# Patient Record
Sex: FEMALE | Race: WHITE | Employment: FULL TIME | URBAN - METROPOLITAN AREA
[De-identification: names, ages, dates, MRNs, and addresses within clinical notes are randomized per-mention and may not be internally consistent; named-entity substitution may affect disease eponyms.]

---

## 2017-08-30 RX ORDER — TRIAMCINOLONE ACETONIDE 5 MG/G
CREAM TOPICAL 2 TIMES DAILY
COMMUNITY
End: 2017-08-30 | Stop reason: SDUPTHER

## 2017-08-30 NOTE — TELEPHONE ENCOUNTER
Anthony Oseguera    Patient is requesting that Dr. Flaquito Grimm call her in Triamcinolone Acetonide Cream USP 0.5% for dermatitis on her hands. She states that she sent an e-mail through [address] [place] [address] [phone] about 2 weeks ago and never heard back. Pharmacy verified.

## 2017-09-04 RX ORDER — TRIAMCINOLONE ACETONIDE 5 MG/G
CREAM TOPICAL 2 TIMES DAILY
Qty: 15 G | Refills: 0 | Status: SHIPPED | OUTPATIENT
Start: 2017-09-04 | End: 2018-03-12

## 2018-01-15 ENCOUNTER — HOSPITAL ENCOUNTER (OUTPATIENT)
Dept: MAMMOGRAPHY | Age: 58
Discharge: HOME | End: 2018-01-15
Attending: FAMILY MEDICINE
Payer: COMMERCIAL

## 2018-01-15 DIAGNOSIS — Z12.39 SCREENING BREAST EXAMINATION: ICD-10-CM

## 2018-01-15 PROCEDURE — 77067 SCR MAMMO BI INCL CAD: CPT

## 2018-01-15 NOTE — PROGRESS NOTES
FINDINGS: There is no significant abnormality or change from the prior study. The patient will be sent a result letter stating that her mammogram is normal.  .  IMPRESSION  IMPRESSION:     1. No evidence for malignancy. BI-RADS Assessment Category 1: Negative. Fadia Muse RECOMMENDATION:     Mammogram in 1 year.

## 2018-01-15 NOTE — LETTER
1/18/2018 11:35 AM 
 
Ms. Suzi Dumont [address] [place] [address] [phone] Dear Suzi Dumont: 
 
Please find your most recent results below. Resulted Orders [facility] BI SCREENING INCL CAD Narrative INDICATION:   Annual exam. 
COMPARISON: Mammogram(s), most recent, 8/29/2016 and 8/17/2015. Laxmi Saucedo COMPOSITION: There are scattered fibroglandular densities. .  
TECHNIQUE: CC and MLO views of each breast were performed. This examination was 
performed with digital technique and subjected to computer-aided detection. Laxmi Saucedo FINDINGS: There is no significant abnormality or change from the prior study. The patient will be sent a result letter stating that her mammogram is normal. 
.  
 Impression IMPRESSION:    
1. No evidence for malignancy. BI-RADS Assessment Category 1: Negative. Laxmi Saucedo RECOMMENDATION:    
Mammogram in 1 year. Please call me if you have any questions: [phone] Sincerely, 
 
 
Dr. Carolyn Vázquez

## 2018-03-12 ENCOUNTER — OFFICE VISIT (OUTPATIENT)
Dept: FAMILY MEDICINE CLINIC | Age: 58
End: 2018-03-12

## 2018-03-12 VITALS
RESPIRATION RATE: 18 BRPM | DIASTOLIC BLOOD PRESSURE: 81 MMHG | SYSTOLIC BLOOD PRESSURE: 134 MMHG | WEIGHT: 222 LBS | HEART RATE: 74 BPM | BODY MASS INDEX: 37.9 KG/M2 | HEIGHT: 64 IN | TEMPERATURE: 96.4 F | OXYGEN SATURATION: 99 %

## 2018-03-12 DIAGNOSIS — L30.9 DERMATITIS: ICD-10-CM

## 2018-03-12 DIAGNOSIS — E55.9 VITAMIN D DEFICIENCY: ICD-10-CM

## 2018-03-12 DIAGNOSIS — F41.9 ANXIETY: ICD-10-CM

## 2018-03-12 DIAGNOSIS — Z00.00 PHYSICAL EXAM: Primary | ICD-10-CM

## 2018-03-12 DIAGNOSIS — L65.9 HAIR LOSS: ICD-10-CM

## 2018-03-12 DIAGNOSIS — E66.9 OBESITY, CLASS II, BMI 35-39.9: ICD-10-CM

## 2018-03-12 RX ORDER — TRIAMCINOLONE ACETONIDE 5 MG/G
CREAM TOPICAL 2 TIMES DAILY
Qty: 30 G | Refills: 0 | Status: SHIPPED | OUTPATIENT
Start: 2018-03-12 | End: 2019-07-29 | Stop reason: SDUPTHER

## 2018-03-12 NOTE — PROGRESS NOTES
Chief Complaint   Patient presents with    Complete Physical     fasting today       Reviewed Record in preparation for visit and have obtained necessary documentation. Identified pt with two pt identifiers (Name @ )    Health Maintenance Due   Topic    Influenza Age 5 to Adult          1. Have you been to the ER, urgent care clinic since your last visit? Hospitalized since your last visit? No    2. Have you seen or consulted any other health care providers outside of the [address] [place] [address] since your last visit? Include any pap smears or colon screening.  No

## 2018-03-12 NOTE — MR AVS SNAPSHOT
[address] 
[phone] Patient: Adeline Sanchez MRN: [id number] MUB:[birth year] Visit Information Date & Time Provider Department Dept. Phone Encounter #  
 3/12/2018 11:00 AM Kitty Ken MD [address] [place] [address] [phone] [id number] Follow-up Instructions Return in about 1 year (around 3/12/2019) for physical exam. Upcoming Health Maintenance Date Due  
 PAP AKA CERVICAL CYTOLOGY 6/13/2019 BREAST CANCER SCRN MAMMOGRAM 1/15/2020 COLONOSCOPY 1/15/2022 DTaP/Tdap/Td series (2 - Td) 3/18/2025 Allergies as of 3/12/2018  Review Complete On: 3/12/2018 By: Kitty Ken MD  
 No Known Allergies Current Immunizations  Reviewed on 6/13/2016 Name Date  
 TD Vaccine 9/8/2006 Tdap 3/18/2015 Not reviewed this visit You Were Diagnosed With   
  
 Codes Comments Physical exam    -  Primary ICD-10-CM: Z00.00 ICD-9-CM: V70.9 Obesity, Class II, BMI 35-39.9     ICD-10-CM: E66.9 ICD-9-CM: 278.00 Vitamin D deficiency     ICD-10-CM: E55.9 ICD-9-CM: 268.9 Anxiety     ICD-10-CM: F41.9 ICD-9-CM: 300.00 Dermatitis     ICD-10-CM: L30.9 ICD-9-CM: 692.9 Vitals BP Pulse Temp Resp Height(growth percentile) Weight(growth percentile) 134/81 (BP 1 Location: Right arm, BP Patient Position: Sitting) 74 96.4 °F (35.8 °C) (Oral) 18 5' 4\" (1.626 m) 222 lb (100.7 kg) LMP SpO2 BMI OB Status Smoking Status 09/04/2011 99% 38.11 kg/m2 Postmenopausal Former Smoker Vitals History BMI and BSA Data Body Mass Index Body Surface Area  
 38.11 kg/m 2 2.13 m 2 Preferred Pharmacy Pharmacy Name Phone [id number] [address] [phone] Your Updated Medication List  
  
   
This list is accurate as of 3/12/18 11:53 AM.  Always use your most recent med list.  
 calcium 500 mg Tab Take  by mouth.  
  
 triamcinolone 0.5 % topical cream  
Commonly known as:  ARISTOCORT Apply  to affected area two (2) times a day. use thin layer VITAMIN D2 PO Take  by mouth. Prescriptions Sent to Pharmacy Refills  
 triamcinolone (ARISTOCORT) 0.5 % topical cream 0 Sig: Apply  to affected area two (2) times a day. use thin layer Class: Normal  
 Pharmacy: [facility] #: [phone] Route: Topical  
  
We Performed the Following CBC WITH AUTOMATED DIFF [91115 CPT(R)] LIPID PANEL [39867 CPT(R)] METABOLIC PANEL, COMPREHENSIVE [42831 CPT(R)] T4, FREE [id number] CPT(R)] TSH 3RD GENERATION [96457 CPT(R)] URINALYSIS W/MICROSCOPIC [22279 CPT(R)] VITAMIN D, 25 HYDROXY [id number] CPT(R)] Follow-up Instructions Return in about 1 year (around 3/12/2019) for physical exam.  
  
  
Patient Instructions Body Mass Index: Care Instructions Your Care Instructions Body mass index (BMI) can help you see if your weight is raising your risk for health problems. It uses a formula to compare how much you weigh with how tall you are. · A BMI lower than 18.5 is considered underweight. · A BMI between 18.5 and 24.9 is considered healthy. · A BMI between 25 and 29.9 is considered overweight. A BMI of 30 or higher is considered obese. If your BMI is in the normal range, it means that you have a lower risk for weight-related health problems. If your BMI is in the overweight or obese range, you may be at increased risk for weight-related health problems, such as high blood pressure, heart disease, stroke, arthritis or joint pain, and diabetes.  If your BMI is in the underweight range, you may be at increased risk for health problems such as fatigue, lower protection (immunity) against illness, muscle loss, bone loss, hair loss, and hormone problems. BMI is just one measure of your risk for weight-related health problems. You may be at higher risk for health problems if you are not active, you eat an unhealthy diet, or you drink too much alcohol or use tobacco products. Follow-up care is a key part of your treatment and safety. Be sure to make and go to all appointments, and call your doctor if you are having problems. It's also a good idea to know your test results and keep a list of the medicines you take. How can you care for yourself at home? · Practice healthy eating habits. This includes eating plenty of fruits, vegetables, whole grains, lean protein, and low-fat dairy. · If your doctor recommends it, get more exercise. Walking is a good choice. Bit by bit, increase the amount you walk every day. Try for at least 30 minutes on most days of the week. · Do not smoke. Smoking can increase your risk for health problems. If you need help quitting, talk to your doctor about stop-smoking programs and medicines. These can increase your chances of quitting for good. · Limit alcohol to 2 drinks a day for men and 1 drink a day for women. Too much alcohol can cause health problems. If you have a BMI higher than 25 · Your doctor may do other tests to check your risk for weight-related health problems. This may include measuring the distance around your waist. A waist measurement of more than 40 inches in men or 35 inches in women can increase the risk of weight-related health problems. · Talk with your doctor about steps you can take to stay healthy or improve your health. You may need to make lifestyle changes to lose weight and stay healthy, such as changing your diet and getting regular exercise. If you have a BMI lower than 18.5 · Your doctor may do other tests to check your risk for health problems. · Talk with your doctor about steps you can take to stay healthy or improve your health.  You may need to make lifestyle changes to gain or maintain weight and stay healthy, such as getting more healthy foods in your diet and doing exercises to build muscle. Where can you learn more? Go to http://aleisha-isabel.info/. Enter S176 in the search box to learn more about \"Body Mass Index: Care Instructions. \" Current as of: October 13, 2016 Content Version: 11.4 © 0990-5385 [employer]. Care instructions adapted under license by [employer] (which disclaims liability or warranty for this information). If you have questions about a medical condition or this instruction, always ask your healthcare professional. [address] any warranty or liability for your use of this information. Introducing [facility]! Dear Ricarda Capone: 
Thank you for requesting a Ybrain account. Our records indicate that you already have an active Ybrain account. You can access your account anytime at [email or web address] Did you know that you can access your hospital and ER discharge instructions at any time in Ybrain? You can also review all of your test results from your hospital stay or ER visit. Additional Information If you have questions, please visit the Frequently Asked Questions section of the Y[employer] website at [email or web address] Remember, Ybrain is NOT to be used for urgent needs. For medical emergencies, dial 911. Now available from your iPhone and Android! Please provide this summary of care documentation to your next provider. Your primary care clinician is listed as Niki Paredes. If you have any questions after today's visit, please call [phone].

## 2018-03-12 NOTE — PATIENT INSTRUCTIONS
Body Mass Index: Care Instructions  Your Care Instructions    Body mass index (BMI) can help you see if your weight is raising your risk for health problems. It uses a formula to compare how much you weigh with how tall you are. · A BMI lower than 18.5 is considered underweight. · A BMI between 18.5 and 24.9 is considered healthy. · A BMI between 25 and 29.9 is considered overweight. A BMI of 30 or higher is considered obese. If your BMI is in the normal range, it means that you have a lower risk for weight-related health problems. If your BMI is in the overweight or obese range, you may be at increased risk for weight-related health problems, such as high blood pressure, heart disease, stroke, arthritis or joint pain, and diabetes. If your BMI is in the underweight range, you may be at increased risk for health problems such as fatigue, lower protection (immunity) against illness, muscle loss, bone loss, hair loss, and hormone problems. BMI is just one measure of your risk for weight-related health problems. You may be at higher risk for health problems if you are not active, you eat an unhealthy diet, or you drink too much alcohol or use tobacco products. Follow-up care is a key part of your treatment and safety. Be sure to make and go to all appointments, and call your doctor if you are having problems. It's also a good idea to know your test results and keep a list of the medicines you take. How can you care for yourself at home? · Practice healthy eating habits. This includes eating plenty of fruits, vegetables, whole grains, lean protein, and low-fat dairy. · If your doctor recommends it, get more exercise. Walking is a good choice. Bit by bit, increase the amount you walk every day. Try for at least 30 minutes on most days of the week. · Do not smoke. Smoking can increase your risk for health problems. If you need help quitting, talk to your doctor about stop-smoking programs and medicines. These can increase your chances of quitting for good. · Limit alcohol to 2 drinks a day for men and 1 drink a day for women. Too much alcohol can cause health problems. If you have a BMI higher than 25  · Your doctor may do other tests to check your risk for weight-related health problems. This may include measuring the distance around your waist. A waist measurement of more than 40 inches in men or 35 inches in women can increase the risk of weight-related health problems. · Talk with your doctor about steps you can take to stay healthy or improve your health. You may need to make lifestyle changes to lose weight and stay healthy, such as changing your diet and getting regular exercise. If you have a BMI lower than 18.5  · Your doctor may do other tests to check your risk for health problems. · Talk with your doctor about steps you can take to stay healthy or improve your health. You may need to make lifestyle changes to gain or maintain weight and stay healthy, such as getting more healthy foods in your diet and doing exercises to build muscle. Where can you learn more? Go to http://aleisha-isabel.info/. Enter S176 in the search box to learn more about \"Body Mass Index: Care Instructions. \"  Current as of: October 13, 2016  Content Version: 11.4  © 6461-7254 [employer]wise[employer]. Care instructions adapted under license by [employer] (which disclaims liability or warranty for this information). If you have questions about a medical condition or this instruction, always ask your healthcare professional. [address] any warranty or liability for your use of this information.

## 2018-03-12 NOTE — PROGRESS NOTES
HISTORY OF PRESENT ILLNESS  Cullen Toledo is a 62 y.o. female. Blood pressure 134/81, pulse 74, temperature 96.4 °F (35.8 °C), temperature source Oral, resp. rate 18, height 5' 4\" (1.626 m), weight 222 lb (100.7 kg), last menstrual period 09/04/2011, SpO2 99 %. Body mass index is 38.11 kg/(m^2). Chief Complaint   Patient presents with    Complete Physical     fasting today        HPI  Cullen Toledo 62 y.o. female  presents to the office today for a complete physical.    Health maintenance: Pt states that she has not been getting sufficient exercise since the weather became cold, but plans on starting on a regular routine again. Pt reports that she has spider tim that she can feel burning in her legs every once in a while. Advised pt that if she wants them taken care of I can refer her to a vascular surgeon. Current Outpatient Prescriptions   Medication Sig Dispense Refill    triamcinolone (ARISTOCORT) 0.5 % topical cream Apply  to affected area two (2) times a day. use thin layer 30 g 0    calcium 500 mg tab Take  by mouth.  ERGOCALCIFEROL, VITAMIN D2, (VITAMIN D2 PO) Take  by mouth. No Known Allergies  Past Medical History:   Diagnosis Date    Abnormal Pap smear     Contact dermatitis and other eczema, due to unspecified cause     warts    Mixed hyperlipidemia      Past Surgical History:   Procedure Laterality Date    BIOPSY BREAST      HX BREAST BIOPSY Left 2004    neg     Family History   Problem Relation Age of Onset    Hypertension Mother     Lung Disease Father      COPD    Cancer Father 68     Lung    Breast Cancer Maternal Aunt     Breast Cancer Maternal Aunt      Social History   Substance Use Topics    Smoking status: Former Smoker    Smokeless tobacco: Never Used    Alcohol use Yes      Comment: rare        Review of Systems   Constitutional: Negative for chills, diaphoresis, fever, malaise/fatigue and weight loss.    HENT: Negative for congestion, ear discharge, ear pain, hearing loss, nosebleeds, sore throat and tinnitus. Eyes: Negative for blurred vision, double vision, photophobia, pain, discharge and redness. Respiratory: Negative for cough, hemoptysis, sputum production, shortness of breath, wheezing and stridor. Cardiovascular: Negative for chest pain, palpitations, orthopnea, claudication, leg swelling and PND. Gastrointestinal: Negative for abdominal pain, blood in stool, constipation, diarrhea, heartburn, melena, nausea and vomiting. Genitourinary: Negative for dysuria, flank pain, frequency, hematuria and urgency. Musculoskeletal: Negative for back pain, falls, joint pain, myalgias and neck pain. Skin: Negative for itching and rash. Neurological: Negative for dizziness, tingling, tremors, sensory change, speech change, focal weakness, seizures, loss of consciousness, weakness and headaches. Endo/Heme/Allergies: Negative for environmental allergies and polydipsia. Does not bruise/bleed easily. Psychiatric/Behavioral: Negative for depression, hallucinations, memory loss, substance abuse and suicidal ideas. The patient is not nervous/anxious and does not have insomnia. All other systems reviewed and are negative. Physical Exam   Constitutional: She is oriented to person, place, and time. She appears well-developed and well-nourished. No distress. HENT:   Head: Normocephalic and atraumatic. Right Ear: External ear normal.   Left Ear: External ear normal.   Nose: Nose normal.   Mouth/Throat: Oropharynx is clear and moist. No oropharyngeal exudate. Eyes: Conjunctivae and EOM are normal. Pupils are equal, round, and reactive to light. Right eye exhibits no discharge. Left eye exhibits no discharge. No scleral icterus. Neck: Normal range of motion. Neck supple. No JVD present. No tracheal deviation present. No thyromegaly present. Cardiovascular: Normal rate, regular rhythm, normal heart sounds and intact distal pulses.   Exam reveals no gallop and no friction rub. No murmur heard. Pulmonary/Chest: Effort normal and breath sounds normal. No stridor. No respiratory distress. She has no wheezes. She has no rales. She exhibits no tenderness. Abdominal: Soft. Bowel sounds are normal. She exhibits no distension and no mass. There is no tenderness. There is no rebound and no guarding. Musculoskeletal: Normal range of motion. She exhibits no edema or tenderness. Lymphadenopathy:     She has no cervical adenopathy. Neurological: She is alert and oriented to person, place, and time. She has normal reflexes. No cranial nerve deficit. She exhibits normal muscle tone. Coordination normal.   Skin: Skin is warm and dry. No rash noted. She is not diaphoretic. No erythema. No pallor. Spider tim, no pain in legs   Psychiatric: She has a normal mood and affect. Her behavior is normal. Judgment and thought content normal.   Nursing note and vitals reviewed. ASSESSMENT and PLAN  Diagnoses and all orders for this visit:    1. Physical exam  -     METABOLIC PANEL, COMPREHENSIVE  -     CBC WITH AUTOMATED DIFF  -     LIPID PANEL  -     TSH 3RD GENERATION  -     T4, FREE  -     VITAMIN D, 25 HYDROXY  -     URINALYSIS W/MICROSCOPIC    2. Obesity, Class II, BMI 35-39.9        - I have reviewed/discussed the above normal BMI with the patient. I have recommended the following interventions: dietary management education, guidance, and counseling, encourage exercise and monitor weight . 3. Vitamin D deficiency        - Presumed stable, will assess levels today. 4. Anxiety        - Stable, continue current regimen. 5. Dermatitis  -     triamcinolone (ARISTOCORT) 0.5 % topical cream; Apply  to affected area two (2) times a day. use thin layer  - Stable, continue current regimen.        Follow-up Disposition:  Return in about 1 year (around 3/12/2019) for physical exam.   Medication risks/benefits/costs/interactions/alternatives discussed with patient. Advised patient to call back or return to office if symptoms worsen/change/persist.  If patient cannot reach us or should anything more severe/urgent arise he/she should proceed directly to the nearest emergency department. Discussed expected course/resolution/complications of diagnosis in detail with patient. Patient given a written after visit summary which includes her diagnoses, current medications and vitals. Patient expressed understanding with the diagnosis and plan. Written by kishor Piper, as dictated by Shlomo De Dios M.D.   I have reviewed and agree with the above note and have made corrections where appropriate, Dr. Avelino Alas MD

## 2018-03-13 LAB
25(OH)D3+25(OH)D2 SERPL-MCNC: 32.6 NG/ML (ref 30–100)
ALBUMIN SERPL-MCNC: 4.4 G/DL (ref 3.5–5.5)
ALBUMIN/GLOB SERPL: 1.5 {RATIO} (ref 1.2–2.2)
ALP SERPL-CCNC: 83 IU/L (ref 39–117)
ALT SERPL-CCNC: 23 IU/L (ref 0–32)
APPEARANCE UR: CLEAR
AST SERPL-CCNC: 22 IU/L (ref 0–40)
BACTERIA #/AREA URNS HPF: NORMAL /[HPF]
BASOPHILS # BLD AUTO: 0 X10E3/UL (ref 0–0.2)
BASOPHILS NFR BLD AUTO: 0 %
BILIRUB SERPL-MCNC: 0.8 MG/DL (ref 0–1.2)
BILIRUB UR QL STRIP: NEGATIVE
BUN SERPL-MCNC: 15 MG/DL (ref 6–24)
BUN/CREAT SERPL: 23 (ref 9–23)
CALCIUM SERPL-MCNC: 9.6 MG/DL (ref 8.7–10.2)
CASTS URNS QL MICRO: NORMAL /LPF
CHLORIDE SERPL-SCNC: 98 MMOL/L (ref 96–106)
CHOLEST SERPL-MCNC: 197 MG/DL (ref 100–199)
CO2 SERPL-SCNC: 23 MMOL/L (ref 18–29)
COLOR UR: YELLOW
CREAT SERPL-MCNC: 0.66 MG/DL (ref 0.57–1)
EOSINOPHIL # BLD AUTO: 0.1 X10E3/UL (ref 0–0.4)
EOSINOPHIL NFR BLD AUTO: 1 %
EPI CELLS #/AREA URNS HPF: NORMAL /HPF
ERYTHROCYTE [DISTWIDTH] IN BLOOD BY AUTOMATED COUNT: 13.6 % (ref 12.3–15.4)
GFR SERPLBLD CREATININE-BSD FMLA CKD-EPI: 113 ML/MIN/1.73
GFR SERPLBLD CREATININE-BSD FMLA CKD-EPI: 98 ML/MIN/1.73
GLOBULIN SER CALC-MCNC: 2.9 G/DL (ref 1.5–4.5)
GLUCOSE SERPL-MCNC: 77 MG/DL (ref 65–99)
GLUCOSE UR QL: NEGATIVE
HCT VFR BLD AUTO: 45.1 % (ref 34–46.6)
HDLC SERPL-MCNC: 56 MG/DL
HGB BLD-MCNC: 14.8 G/DL (ref 11.1–15.9)
HGB UR QL STRIP: NEGATIVE
IMM GRANULOCYTES # BLD: 0 X10E3/UL (ref 0–0.1)
IMM GRANULOCYTES NFR BLD: 0 %
INTERPRETATION, 910389: NORMAL
KETONES UR QL STRIP: ABNORMAL
LDLC SERPL CALC-MCNC: 118 MG/DL (ref 0–99)
LEUKOCYTE ESTERASE UR QL STRIP: ABNORMAL
LYMPHOCYTES # BLD AUTO: 2.6 X10E3/UL (ref 0.7–3.1)
LYMPHOCYTES NFR BLD AUTO: 30 %
MCH RBC QN AUTO: 29.1 PG (ref 26.6–33)
MCHC RBC AUTO-ENTMCNC: 32.8 G/DL (ref 31.5–35.7)
MCV RBC AUTO: 89 FL (ref 79–97)
MICRO URNS: ABNORMAL
MONOCYTES # BLD AUTO: 0.6 X10E3/UL (ref 0.1–0.9)
MONOCYTES NFR BLD AUTO: 7 %
MUCOUS THREADS URNS QL MICRO: PRESENT
NEUTROPHILS # BLD AUTO: 5.3 X10E3/UL (ref 1.4–7)
NEUTROPHILS NFR BLD AUTO: 62 %
NITRITE UR QL STRIP: NEGATIVE
PH UR STRIP: 5 [PH] (ref 5–7.5)
PLATELET # BLD AUTO: 243 X10E3/UL (ref 150–379)
POTASSIUM SERPL-SCNC: 4.9 MMOL/L (ref 3.5–5.2)
PROT SERPL-MCNC: 7.3 G/DL (ref 6–8.5)
PROT UR QL STRIP: NEGATIVE
RBC # BLD AUTO: 5.09 X10E6/UL (ref 3.77–5.28)
RBC #/AREA URNS HPF: NORMAL /HPF
SODIUM SERPL-SCNC: 142 MMOL/L (ref 134–144)
SP GR UR: 1.02 (ref 1–1.03)
T4 FREE SERPL-MCNC: 1.23 NG/DL (ref 0.82–1.77)
TRIGL SERPL-MCNC: 114 MG/DL (ref 0–149)
TSH SERPL DL<=0.005 MIU/L-ACNC: 1.15 UIU/ML (ref 0.45–4.5)
UROBILINOGEN UR STRIP-MCNC: 0.2 MG/DL (ref 0.2–1)
VLDLC SERPL CALC-MCNC: 23 MG/DL (ref 5–40)
WBC # BLD AUTO: 8.6 X10E3/UL (ref 3.4–10.8)
WBC #/AREA URNS HPF: NORMAL /HPF

## 2018-03-18 NOTE — PROGRESS NOTES
Inform pt to go to my chart to see results and recommendations    Ms. Ureña Flattery,  All the labs look good  No issues. Cholesterol number have improved  Any questions please ask me.

## 2018-08-28 NOTE — PROGRESS NOTES
Per Tomasz Frye TSH, T4 is not covered with CPE per Dr Davis Close to add dx fatigue D53.83 notified Tomasz Frye

## 2019-02-12 ENCOUNTER — HOSPITAL ENCOUNTER (OUTPATIENT)
Dept: MAMMOGRAPHY | Age: 59
Discharge: HOME | End: 2019-02-12
Attending: FAMILY MEDICINE
Payer: COMMERCIAL

## 2019-02-12 DIAGNOSIS — Z12.39 SCREENING BREAST EXAMINATION: ICD-10-CM

## 2019-02-12 PROCEDURE — 77063 BREAST TOMOSYNTHESIS BI: CPT

## 2019-02-14 NOTE — PROGRESS NOTES
FINDINGS: Bilateral digital screening mammography was performed and is  interpreted in conjunction with a computer assisted detection (CAD) system. Additionally, tomosynthesis of both breasts in the CC and MLO projections was  performed. No suspicious masses or calcifications are identified. There has been  no significant change.     IMPRESSION  IMPRESSION:  BI-RADS 1: Negative. No mammographic evidence of malignancy.     RECOMMENDATIONS:  Next screening mammogram is recommended in one year.

## 2019-07-29 ENCOUNTER — HOSPITAL ENCOUNTER (OUTPATIENT)
Dept: LAB | Age: 59
Discharge: HOME | End: 2019-07-29
Payer: COMMERCIAL

## 2019-07-29 ENCOUNTER — OFFICE VISIT (OUTPATIENT)
Dept: FAMILY MEDICINE CLINIC | Age: 59
End: 2019-07-29

## 2019-07-29 VITALS
OXYGEN SATURATION: 98 % | HEIGHT: 64 IN | TEMPERATURE: 98.2 F | DIASTOLIC BLOOD PRESSURE: 88 MMHG | BODY MASS INDEX: 38.45 KG/M2 | SYSTOLIC BLOOD PRESSURE: 138 MMHG | RESPIRATION RATE: 16 BRPM | WEIGHT: 225.2 LBS | HEART RATE: 79 BPM

## 2019-07-29 DIAGNOSIS — Z01.419 ROUTINE GYNECOLOGICAL EXAMINATION: ICD-10-CM

## 2019-07-29 DIAGNOSIS — Z00.00 ROUTINE GENERAL MEDICAL EXAMINATION AT A HEALTH CARE FACILITY: Primary | ICD-10-CM

## 2019-07-29 DIAGNOSIS — E66.01 SEVERE OBESITY (HCC): ICD-10-CM

## 2019-07-29 DIAGNOSIS — R03.0 ELEVATED BLOOD PRESSURE READING: ICD-10-CM

## 2019-07-29 DIAGNOSIS — Z00.00 LABORATORY TESTS ORDERED AS PART OF A COMPLETE PHYSICAL EXAM (CPE): ICD-10-CM

## 2019-07-29 DIAGNOSIS — L30.9 DERMATITIS: ICD-10-CM

## 2019-07-29 DIAGNOSIS — M79.602 LEFT ARM PAIN: ICD-10-CM

## 2019-07-29 DIAGNOSIS — Z00.00 ROUTINE GENERAL MEDICAL EXAMINATION AT A HEALTH CARE FACILITY: ICD-10-CM

## 2019-07-29 DIAGNOSIS — E55.9 VITAMIN D DEFICIENCY: ICD-10-CM

## 2019-07-29 PROCEDURE — 88175 CYTOPATH C/V AUTO FLUID REDO: CPT

## 2019-07-29 PROCEDURE — 87624 HPV HI-RISK TYP POOLED RSLT: CPT

## 2019-07-29 RX ORDER — TRIAMCINOLONE ACETONIDE 5 MG/G
CREAM TOPICAL 2 TIMES DAILY
Qty: 30 G | Refills: 1 | Status: SHIPPED | OUTPATIENT
Start: 2019-07-29 | End: 2020-08-25

## 2019-07-29 NOTE — PROGRESS NOTES
HPI  Susan Minor 61 y.o. female  presents to the office today for CPE. Blood pressure 138/88, pulse 79, temperature 98.2 °F (36.8 °C), temperature source Oral, resp. rate 16, height 5' 4\" (1.626 m), weight 225 lb 3.2 oz (102.2 kg), last menstrual period 09/04/2011, SpO2 98 %. Body mass index is 38.66 kg/m². Chief Complaint   Patient presents with    Complete Physical     fasting, also needs pap        Obesity: I have reviewed/discussed the above normal BMI with the patient. Vitamin D deficiency: Pt's vitamin D levels were 32.6 on 3/12/18. Pt continues with Ergocalciferol. Presumed stable, will assess levels today. Left arm pain: Pt reports experiencing intermittent pain of left arm x several months. Pt denies numbness or pain radiating down the arm, as well as denying CP or SOB. Pt does not know anything that could make the pain worsen, and she also does not remember any past incidents that could be causing the onset of pain. I discussed with pt that it could purely muscular problem, and I advised pt that she should follow up with PT. Pt expresses hesitation with PT due to high cost, but I persuaded pt to try one session of PT, and if pt does not find it helpful, she can stop going. I also recommended pt several at-home exercising routines that are proved helpful to alleviate muscle pain. Pt decides to try the exercising routines first, and if sx's do not improve, pt will let me know so I can refer her to PT. Dermatitis: Pt is using Triamcinolone 0.5 % topical cream BID. Elevated BP: BP at office today 157/85 and 138/88 on manual recheck. I discussed with pt the risks of end organ damage with BP > 135/85. I advised pt to check her BP regularly at home, and I will provide flow chart so pt can record her BP readings and send it back to me via [employer]. If BP remains elevated, pt will need to let me know. I also explained to pt the proper techniques to obtain accurate readings.  I also recommended reducing sodium intake in pt's diet in order to keep her BP under control. Health Maintenance: We will proceed with PAP today. Current Outpatient Medications   Medication Sig Dispense Refill    triamcinolone (ARISTOCORT) 0.5 % topical cream Apply  to affected area two (2) times a day. use thin layer 30 g 1    calcium 500 mg tab Take  by mouth.  ERGOCALCIFEROL, VITAMIN D2, (VITAMIN D2 PO) Take  by mouth. No Known Allergies  Past Medical History:   Diagnosis Date    Abnormal Pap smear     Contact dermatitis and other eczema, due to unspecified cause     warts    Mixed hyperlipidemia      Past Surgical History:   Procedure Laterality Date    BIOPSY BREAST      HX BREAST BIOPSY Left 2004    neg     Family History   Problem Relation Age of Onset    Hypertension Mother     Lung Disease Father         COPD    Cancer Father 68        Lung    Breast Cancer Maternal Aunt     Breast Cancer Maternal Aunt      Social History     Tobacco Use    Smoking status: Former Smoker    Smokeless tobacco: Never Used   Substance Use Topics    Alcohol use: Yes     Comment: rare        Review of Systems   Constitutional: Negative for chills and fever. HENT: Negative for hearing loss and tinnitus. Eyes: Negative for blurred vision and double vision. Respiratory: Negative for cough and shortness of breath. Cardiovascular: Negative for chest pain and palpitations. Gastrointestinal: Negative for nausea and vomiting. Genitourinary: Negative for dysuria and frequency. Musculoskeletal: Negative for back pain and falls. Left arm pain   Skin: Negative for itching and rash. Neurological: Negative for dizziness, loss of consciousness and headaches. Psychiatric/Behavioral: Negative for depression. The patient is not nervous/anxious. Physical Exam   Constitutional: She is oriented to person, place, and time. She appears well-developed and well-nourished.    HENT:   Head: Normocephalic and atraumatic. Right Ear: External ear normal.   Left Ear: External ear normal.   Nose: Nose normal.   Mouth/Throat: Oropharynx is clear and moist.   Eyes: Conjunctivae and EOM are normal.   Neck: Normal range of motion. Neck supple. Cardiovascular: Normal rate, regular rhythm, normal heart sounds and intact distal pulses. Pulmonary/Chest: Effort normal and breath sounds normal.   Abdominal: Soft. Bowel sounds are normal.   Musculoskeletal: Normal range of motion. Left arm pain   Neurological: She is alert and oriented to person, place, and time. Skin: Skin is warm and dry. Psychiatric: She has a normal mood and affect. Her behavior is normal. Judgment and thought content normal.   Nursing note and vitals reviewed. ASSESSMENT and PLAN  Diagnoses and all orders for this visit:    1. Routine general medical examination at a health care facility  -     AMB POC EKG ROUTINE W/ 12 LEADS, INTER & REP  -     PAP IG, APTIMA HPV AND RFX 16/18,45 ([id number]); Future    2. Laboratory tests ordered as part of a complete physical exam (CPE)  -     LIPID PANEL  -     CBC W/O DIFF  -     METABOLIC PANEL, COMPREHENSIVE  -     TSH AND FREE T4  -     VITAMIN D, 25 HYDROXY  -     URINALYSIS W/ RFLX MICROSCOPIC    3. Routine gynecological examination  -     LIPID PANEL  -     CBC W/O DIFF  -     METABOLIC PANEL, COMPREHENSIVE  -     TSH AND FREE T4  -     VITAMIN D, 25 HYDROXY  -     URINALYSIS W/ RFLX MICROSCOPIC  -     PAP IG, APTIMA HPV AND RFX 16/18,45 ([id number]); Future    4. Vitamin D deficiency  Presumed stable, will assess levels today. Advised pt to continue with Ergocalciferol.   -     VITAMIN D, 25 HYDROXY    5. Severe obesity ([address]  I have reviewed/discussed the above normal BMI with the patient. I have recommended the following interventions: dietary management education, guidance, and counseling, encourage exercise and monitor weight . 6.  Left arm pain  Recommended pt at-home exercising routines to help alleviating pain. If pain does not improve or worsens, pt will let me know so I can refer her to PT.     7. Dermatitis  Advised pt to continue with Triamcinolone 0.5 % topical cream BID. -     triamcinolone (ARISTOCORT) 0.5 % topical cream; Apply  to affected area two (2) times a day. use thin layer    8. Elevated blood pressure reading  Provided pt with flowsheet. Advised pt to record BP regularly and send back to me via [employer]. Advised pt to reduce sodium intake.   -     [facility] MY[facility]ART BP FLOWSHEET    Follow-up and Dispositions    · Return in about 1 year (around 7/29/2020) for physical exam.        Medication risks/benefits/costs/interactions/alternatives discussed with patient. Advised patient to call back or return to office if symptoms worsen/change/persist.  If patient cannot reach us or should anything more severe/urgent arise he/she should proceed directly to the nearest emergency department. Discussed expected course/resolution/complications of diagnosis in detail with patient. Patient given a written after visit summary which includes her diagnoses, current medications and vitals. Patient expressed understanding with the diagnosis and plan. Written by kishor Anderson, as dictated by Melanie Garcia M.D.    11:42 AM - 12:20 PM    Total time spent with the patient 38 minutes, greater than 50% of time spent counseling patient.

## 2019-07-29 NOTE — PATIENT INSTRUCTIONS
Biceps Tendinitis: Exercises  Introduction  Here are some examples of exercises for you to try. The exercises may be suggested for a condition or for rehabilitation. Start each exercise slowly. Ease off the exercises if you start to have pain. You will be told when to start these exercises and which ones will work best for you. How to do the exercises  Biceps stretch    1. Stand and hold your affected arm out to the side, with your hand at about hip level. 2. Gently bend your wrist back so that your fingers point down toward the floor. 3. You may also do this next to a wall and rest your fingers on the wall. 4. For more of a stretch, bend your head to the opposite side of your affected arm. 5. Hold for 15 to 30 seconds. 6. Repeat 2 to 4 times. Resisted supination    1. Sit leaning forward with your legs slightly spread. Then place your forearm on your thigh with your hand and affected wrist in front of your knee. 2. Grasp one end of an exercise band with your palm down, and step on the other end. 3. Keeping your wrist straight, roll your palm outward and away from your thigh for a count of 2, then slowly move your wrist back to the starting position to a count of 5.  4. Repeat 8 to 12 times. Resisted elbow flexion    1. Using your affected arm, hold one end of an elastic band in your hand. 2. Place the other end of the band under your foot on the same side of your body as your affected arm. 3. Slowly bend your elbow and bring your hand toward your shoulder. Your palm and the underside of your wrist should be facing up as you pull the band toward your shoulder. Count to 2 as you pull up. 4. Relax and slowly return to your starting position. Count to 5 as you return to the start. 5. Repeat 8 to 12 times. Resisted elbow flexion at shoulder level    1. Tie the ends of an exercise band together to form a loop.  Attach one end of the loop to a secure object or shut a door on it to hold it in place. (Or you can have someone hold one end of the loop to provide resistance.) The band should be at shoulder level. 2. Stand facing where you have tied or fastened the band. 3. Start with your affected arm held out straight, holding the band in your hand. 4. Slowly bend your elbow to 90 degrees, bringing your hand toward your forehead. Count to 2 as you pull the band toward your head. 5. Relax and slowly return to your starting position. Count to 5 as you return to the start. 6. Repeat 8 to 12 times. Follow-up care is a key part of your treatment and safety. Be sure to make and go to all appointments, and call your doctor if you are having problems. It's also a good idea to know your test results and keep a list of the medicines you take. Where can you learn more? Go to http://aleisha-isabel.info/. Enter K463 in the search box to learn more about \"Biceps Tendinitis: Exercises. \"  Current as of: September 20, 2018  Content Version: 12.1  © 8202-3756 [employer], [employer]. Care instructions adapted under license by [employer] (which disclaims liability or warranty for this information). If you have questions about a medical condition or this instruction, always ask your healthcare professional. [address] any warranty or liability for your use of this information.

## 2019-07-29 NOTE — PROGRESS NOTES
Chief Complaint   Patient presents with    Complete Physical       Reviewed Record in preparation for visit and have obtained necessary documentation. Identified pt with two pt identifiers (Name @ )    Health Maintenance Due   Topic    PAP AKA CERVICAL CYTOLOGY          1. Have you been to the ER, urgent care clinic since your last visit? Hospitalized since your last visit? no    2. Have you seen or consulted any other health care providers outside of the [address] [place] [address] since your last visit? Include any pap smears or colon screening.   no

## 2019-07-30 LAB
ALBUMIN SERPL-MCNC: 4.2 G/DL (ref 3.5–5.5)
ALBUMIN/GLOB SERPL: 1.7 {RATIO} (ref 1.2–2.2)
ALP SERPL-CCNC: 95 IU/L (ref 39–117)
ALT SERPL-CCNC: 19 IU/L (ref 0–32)
APPEARANCE UR: CLEAR
AST SERPL-CCNC: 18 IU/L (ref 0–40)
BILIRUB SERPL-MCNC: 0.8 MG/DL (ref 0–1.2)
BILIRUB UR QL STRIP: NEGATIVE
BUN SERPL-MCNC: 12 MG/DL (ref 6–24)
BUN/CREAT SERPL: 14 (ref 9–23)
CALCIUM SERPL-MCNC: 9.1 MG/DL (ref 8.7–10.2)
CHLORIDE SERPL-SCNC: 100 MMOL/L (ref 96–106)
CHOLEST SERPL-MCNC: 205 MG/DL (ref 100–199)
CO2 SERPL-SCNC: 24 MMOL/L (ref 20–29)
COLOR UR: YELLOW
CREAT SERPL-MCNC: 0.86 MG/DL (ref 0.57–1)
ERYTHROCYTE [DISTWIDTH] IN BLOOD BY AUTOMATED COUNT: 12.9 % (ref 12.3–15.4)
GLOBULIN SER CALC-MCNC: 2.5 G/DL (ref 1.5–4.5)
GLUCOSE SERPL-MCNC: 82 MG/DL (ref 65–99)
GLUCOSE UR QL: NEGATIVE
HCT VFR BLD AUTO: 43.1 % (ref 34–46.6)
HDLC SERPL-MCNC: 57 MG/DL
HGB BLD-MCNC: 14.3 G/DL (ref 11.1–15.9)
HGB UR QL STRIP: NEGATIVE
INTERPRETATION, 910389: NORMAL
KETONES UR QL STRIP: NEGATIVE
LDLC SERPL CALC-MCNC: 130 MG/DL (ref 0–99)
LEUKOCYTE ESTERASE UR QL STRIP: NEGATIVE
MCH RBC QN AUTO: 28.6 PG (ref 26.6–33)
MCHC RBC AUTO-ENTMCNC: 33.2 G/DL (ref 31.5–35.7)
MCV RBC AUTO: 86 FL (ref 79–97)
MICRO URNS: NORMAL
NITRITE UR QL STRIP: NEGATIVE
PH UR STRIP: 6 [PH] (ref 5–7.5)
PLATELET # BLD AUTO: 223 X10E3/UL (ref 150–450)
POTASSIUM SERPL-SCNC: 4.3 MMOL/L (ref 3.5–5.2)
PROT SERPL-MCNC: 6.7 G/DL (ref 6–8.5)
PROT UR QL STRIP: NEGATIVE
RBC # BLD AUTO: 5 X10E6/UL (ref 3.77–5.28)
SODIUM SERPL-SCNC: 138 MMOL/L (ref 134–144)
SP GR UR: 1.01 (ref 1–1.03)
TRIGL SERPL-MCNC: 90 MG/DL (ref 0–149)
UROBILINOGEN UR STRIP-MCNC: 0.2 MG/DL (ref 0.2–1)
VLDLC SERPL CALC-MCNC: 18 MG/DL (ref 5–40)
WBC # BLD AUTO: 8.9 X10E3/UL (ref 3.4–10.8)

## 2019-08-04 NOTE — PROGRESS NOTES
Inform pt to go to my chart to see results and recommendations    Please work on diet and exercise     LDL ( bad cholesterol has gone up a bit)    Rest of the labs are stable      Any questions let me know.

## 2019-08-04 NOTE — PROGRESS NOTES
Cervical/Endocervical Imaged Processed Thin Prep  Satisfactory for evaluation. NEGATIVE FOR INTRAEPITHELIAL LESION OR MALIGNANCY.      Recheck in 3 years

## 2020-05-29 ENCOUNTER — HOSPITAL ENCOUNTER (OUTPATIENT)
Dept: MAMMOGRAPHY | Age: 60
Discharge: HOME | End: 2020-05-29
Attending: FAMILY MEDICINE
Payer: COMMERCIAL

## 2020-05-29 DIAGNOSIS — Z12.31 VISIT FOR SCREENING MAMMOGRAM: ICD-10-CM

## 2020-05-29 PROCEDURE — 77063 BREAST TOMOSYNTHESIS BI: CPT

## 2020-06-03 NOTE — PROGRESS NOTES
[address] [facility] Mammography Department  [address]   [address] [facility] [address]  Mail results to patient

## 2020-08-19 ENCOUNTER — APPOINTMENT (OUTPATIENT)
Dept: FAMILY MEDICINE CLINIC | Age: 60
End: 2020-08-19

## 2020-08-19 DIAGNOSIS — E55.9 VITAMIN D DEFICIENCY: ICD-10-CM

## 2020-08-19 DIAGNOSIS — Z00.00 PHYSICAL EXAM: ICD-10-CM

## 2020-08-19 DIAGNOSIS — E66.9 OBESITY, CLASS II, BMI 35-39.9: ICD-10-CM

## 2020-08-19 DIAGNOSIS — Z00.00 ROUTINE GENERAL MEDICAL EXAMINATION AT A HEALTH CARE FACILITY: Primary | ICD-10-CM

## 2020-08-19 DIAGNOSIS — E66.01 SEVERE OBESITY (HCC): ICD-10-CM

## 2020-08-20 LAB
25(OH)D3+25(OH)D2 SERPL-MCNC: 26.1 NG/ML (ref 30–100)
ALBUMIN SERPL-MCNC: 4.5 G/DL (ref 3.8–4.9)
ALBUMIN/GLOB SERPL: 2 {RATIO} (ref 1.2–2.2)
ALP SERPL-CCNC: 88 IU/L (ref 39–117)
ALT SERPL-CCNC: 24 IU/L (ref 0–32)
APPEARANCE UR: CLEAR
AST SERPL-CCNC: 18 IU/L (ref 0–40)
BASOPHILS # BLD AUTO: 0.1 X10E3/UL (ref 0–0.2)
BASOPHILS NFR BLD AUTO: 1 %
BILIRUB SERPL-MCNC: 0.6 MG/DL (ref 0–1.2)
BILIRUB UR QL STRIP: NEGATIVE
BUN SERPL-MCNC: 11 MG/DL (ref 8–27)
BUN/CREAT SERPL: 13 (ref 12–28)
CALCIUM SERPL-MCNC: 9.3 MG/DL (ref 8.7–10.3)
CHLORIDE SERPL-SCNC: 102 MMOL/L (ref 96–106)
CHOLEST SERPL-MCNC: 224 MG/DL (ref 100–199)
CO2 SERPL-SCNC: 21 MMOL/L (ref 20–29)
COLOR UR: YELLOW
CREAT SERPL-MCNC: 0.88 MG/DL (ref 0.57–1)
EOSINOPHIL # BLD AUTO: 0.1 X10E3/UL (ref 0–0.4)
EOSINOPHIL NFR BLD AUTO: 1 %
ERYTHROCYTE [DISTWIDTH] IN BLOOD BY AUTOMATED COUNT: 12.3 % (ref 11.7–15.4)
GLOBULIN SER CALC-MCNC: 2.3 G/DL (ref 1.5–4.5)
GLUCOSE SERPL-MCNC: 86 MG/DL (ref 65–99)
GLUCOSE UR QL: NEGATIVE
HCT VFR BLD AUTO: 43.1 % (ref 34–46.6)
HDLC SERPL-MCNC: 69 MG/DL
HGB BLD-MCNC: 14.9 G/DL (ref 11.1–15.9)
HGB UR QL STRIP: NEGATIVE
IMM GRANULOCYTES # BLD AUTO: 0 X10E3/UL (ref 0–0.1)
IMM GRANULOCYTES NFR BLD AUTO: 0 %
INTERPRETATION, 910389: NORMAL
KETONES UR QL STRIP: NEGATIVE
LDLC SERPL CALC-MCNC: 140 MG/DL (ref 0–99)
LEUKOCYTE ESTERASE UR QL STRIP: NEGATIVE
LYMPHOCYTES # BLD AUTO: 2.2 X10E3/UL (ref 0.7–3.1)
LYMPHOCYTES NFR BLD AUTO: 30 %
MCH RBC QN AUTO: 29.3 PG (ref 26.6–33)
MCHC RBC AUTO-ENTMCNC: 34.6 G/DL (ref 31.5–35.7)
MCV RBC AUTO: 85 FL (ref 79–97)
MICRO URNS: NORMAL
MONOCYTES # BLD AUTO: 0.5 X10E3/UL (ref 0.1–0.9)
MONOCYTES NFR BLD AUTO: 7 %
NEUTROPHILS # BLD AUTO: 4.4 X10E3/UL (ref 1.4–7)
NEUTROPHILS NFR BLD AUTO: 61 %
NITRITE UR QL STRIP: NEGATIVE
PH UR STRIP: 6.5 [PH] (ref 5–7.5)
PLATELET # BLD AUTO: 208 X10E3/UL (ref 150–450)
POTASSIUM SERPL-SCNC: 4.4 MMOL/L (ref 3.5–5.2)
PROT SERPL-MCNC: 6.8 G/DL (ref 6–8.5)
PROT UR QL STRIP: NEGATIVE
RBC # BLD AUTO: 5.09 X10E6/UL (ref 3.77–5.28)
SODIUM SERPL-SCNC: 139 MMOL/L (ref 134–144)
SP GR UR: 1.01 (ref 1–1.03)
T4 FREE SERPL-MCNC: 1.36 NG/DL (ref 0.82–1.77)
TRIGL SERPL-MCNC: 76 MG/DL (ref 0–149)
TSH SERPL DL<=0.005 MIU/L-ACNC: 1.69 UIU/ML (ref 0.45–4.5)
UROBILINOGEN UR STRIP-MCNC: 0.2 MG/DL (ref 0.2–1)
VLDLC SERPL CALC-MCNC: 15 MG/DL (ref 5–40)
WBC # BLD AUTO: 7.3 X10E3/UL (ref 3.4–10.8)

## 2020-08-25 ENCOUNTER — OFFICE VISIT (OUTPATIENT)
Dept: FAMILY MEDICINE CLINIC | Age: 60
End: 2020-08-25
Payer: COMMERCIAL

## 2020-08-25 VITALS
HEART RATE: 70 BPM | DIASTOLIC BLOOD PRESSURE: 80 MMHG | HEIGHT: 65 IN | TEMPERATURE: 98.2 F | SYSTOLIC BLOOD PRESSURE: 136 MMHG | BODY MASS INDEX: 37.36 KG/M2 | WEIGHT: 224.2 LBS | OXYGEN SATURATION: 97 % | RESPIRATION RATE: 20 BRPM

## 2020-08-25 DIAGNOSIS — Z00.00 PHYSICAL EXAM: Primary | ICD-10-CM

## 2020-08-25 DIAGNOSIS — K14.6 BMS (BURNING MOUTH SYNDROME): ICD-10-CM

## 2020-08-25 PROCEDURE — 99396 PREV VISIT EST AGE 40-64: CPT | Performed by: FAMILY MEDICINE

## 2020-08-25 NOTE — PROGRESS NOTES
Chief Complaint   Patient presents with    Complete Physical     pt here for yearly phyiscal    Gum Problem     pt state she is feeling sensation like she burned her tongue and metallic taste in mouth since June 2020     1. Have you been to the ER, urgent care clinic since your last visit? Hospitalized since your last visit? No    2. Have you seen or consulted any other health care providers outside of the [address] [place] [address] since your last visit? Include any pap smears or colon screening.  No

## 2020-08-25 NOTE — PROGRESS NOTES
HPI  Rene Garcia 61 y.o. female  presents to the office today for a CPE. Blood pressure 136/80, pulse 70, temperature 98.2 °F (36.8 °C), temperature source Oral, resp. rate 20, height 5' 5\" (1.651 m), weight 224 lb 3.2 oz (101.7 kg), last menstrual period 09/04/2011, SpO2 97 %. Body mass index is 37.31 kg/m². Chief Complaint   Patient presents with    Complete Physical     pt here for yearly phyiscal    Gum Problem     pt state she is feeling sensation like she burned her tongue and metallic taste in mouth since June 2020        Pt comes in today for a CPE. All findings were normal.     Obesity: I have reviewed/discussed the above normal BMI with the patient. Pt presents today with pain in her gums that feels as if \"she burned her tongue\". Pt states that she also has a metallic taste in her mouth for several months as well. Pt denies any causative factors. Pt states that the pain comes and goes and she does not believe that she needs medication. These sxs have been continuous since June 2020. I have diagnosed pt with burning mouth syndrome. Pt states that she would like to wait to use medication. We discussed how she could take medications for restless leg syndrome and clonazepam to treat. However, I informed her the the clonazepam has a highly addictive feature and she stated she would like to avoid taking it. Current Outpatient Medications   Medication Sig Dispense Refill    multivitamin, tx-iron-ca-min (THERA-M w/ IRON) 9 mg iron-400 mcg tab tablet Take 1 Tab by mouth daily.  ERGOCALCIFEROL, VITAMIN D2, (VITAMIN D2 PO) Take  by mouth.        No Known Allergies  Past Medical History:   Diagnosis Date    Abnormal Pap smear     Contact dermatitis and other eczema, due to unspecified cause     warts    Mixed hyperlipidemia      Past Surgical History:   Procedure Laterality Date    BIOPSY BREAST      HX BREAST BIOPSY Left 2004    benign     Family History   Problem Relation Age of Onset  Hypertension Mother     Lung Disease Father         COPD    Cancer Father 68        Lung    Breast Cancer Maternal Aunt     Breast Cancer Maternal Aunt      Social History     Tobacco Use    Smoking status: Former Smoker    Smokeless tobacco: Never Used   Substance Use Topics    Alcohol use: Yes     Comment: rare        Review of Systems   Constitutional: Negative for chills and fever. HENT: Negative for hearing loss and tinnitus. Eyes: Negative for blurred vision and double vision. Respiratory: Negative for cough and shortness of breath. Cardiovascular: Negative for chest pain and palpitations. Gastrointestinal: Negative for nausea and vomiting. Genitourinary: Negative for dysuria and frequency. Musculoskeletal: Negative for back pain and falls. Skin: Negative for itching and rash. Neurological: Negative for dizziness, loss of consciousness and headaches. Endo/Heme/Allergies: Negative. Psychiatric/Behavioral: Negative for depression. The patient is not nervous/anxious. Physical Exam  Constitutional:       Appearance: Normal appearance. HENT:      Head: Normocephalic and atraumatic. Right Ear: Tympanic membrane, ear canal and external ear normal.      Left Ear: Tympanic membrane, ear canal and external ear normal.      Nose: Nose normal.      Mouth/Throat:      Mouth: Mucous membranes are moist.      Pharynx: Oropharynx is clear. Eyes:      Extraocular Movements: Extraocular movements intact. Conjunctiva/sclera: Conjunctivae normal.      Pupils: Pupils are equal, round, and reactive to light. Cardiovascular:      Rate and Rhythm: Normal rate and regular rhythm. Pulses: Normal pulses. Heart sounds: Normal heart sounds. Pulmonary:      Effort: Pulmonary effort is normal.      Breath sounds: Normal breath sounds. Abdominal:      General: Abdomen is flat. Bowel sounds are normal.      Palpations: Abdomen is soft.    Genitourinary:     General: Normal vulva. Rectum: Normal.   Musculoskeletal: Normal range of motion. Skin:     General: Skin is warm and dry. Neurological:      General: No focal deficit present. Mental Status: She is alert and oriented to person, place, and time. Mental status is at baseline. Psychiatric:         Mood and Affect: Mood normal.         Behavior: Behavior normal.         Judgment: Judgment normal.           ASSESSMENT and PLAN  Diagnoses and all orders for this visit:    1. Physical exam  A CPE was performed during today's OV. All findings were normal.     2. BMS (burning mouth syndrome)  Pt presents today with pain in her gums that feels as if \"she burned her tongue\". Pt states that she also has a metallic taste in her mouth for several months as well. Pt denies any causative factors. Pt states that the pain comes and goes and she does not believe that she needs medication. These sxs have been continuous since June 2020. I have diagnosed pt with burning mouth syndrome. Pt states that she would like to wait to use medication. Follow-up and Dispositions    · Return in about 1 year (around 8/25/2021) for chronic follow up. Medication risks/benefits/costs/interactions/alternatives discussed with patient. Advised patient to call back or return to office if symptoms worsen/change/persist.  If patient cannot reach us or should anything more severe/urgent arise he/she should proceed directly to the nearest emergency department. Discussed expected course/resolution/complications of diagnosis in detail with patient. Patient given a written after visit summary which includes diagnoses, current medications and vitals. Patient expressed understanding with the diagnosis and plan. Written by kishor Abdullahi, as dictated by Cecelia Beltran M.D.    9:46 AM - 10:27 PM    Total time spent with the patient 41 minutes, greater than 50% of time spent counseling patient.

## 2020-11-12 NOTE — TELEPHONE ENCOUNTER
MD Colón,    Patient called and requested a prescription for Tacrolimus 0.1% ointment. Not found on med list or in past hx. Attached rx if appropriate. Please review. Asked for callback to know if approved and sent. [phone]. Thanks, Valentino Eastern    Last Visit: 8/25/20  MD Colón  Next Appointment: Not scheduled- to return 1 year  Previous Refill Encounter(s): None    Requested Prescriptions     Pending Prescriptions Disp Refills    tacrolimus (PROTOPIC) 0.1 % ointment 30 g 0     Sig: Apply  to affected area two (2) times a day.

## 2020-11-17 RX ORDER — TACROLIMUS 1 MG/G
OINTMENT TOPICAL 2 TIMES DAILY
Qty: 30 G | Refills: 0 | Status: SHIPPED | OUTPATIENT
Start: 2020-11-17 | End: 2022-10-17 | Stop reason: ALTCHOICE

## 2021-03-30 ENCOUNTER — TELEPHONE (OUTPATIENT)
Dept: FAMILY MEDICINE CLINIC | Age: 61
End: 2021-03-30

## 2021-03-30 NOTE — TELEPHONE ENCOUNTER
----- Message from Kaye Hayden sent at 3/30/2021  9:36 AM EDT -----  Regarding: Dr. Viky Carrillo  Referral    Caller (first and last name if not the patient or from practice):      Caller's relationship to patient (if not from a practice): pt      Name of caller (if calling from a practice): self      Name of practice: n/a      Specialist's title, first, and last name: n/a      Office Phone Number: n/a      Fax number: n/a      Date and time of appointment: n/a      Reason for appointment:  Vein Thrombosis      Details to clarify the request: pt stating she needs a referral for her Vein pain and she doesn't know if it is a blood clot.   Please call pt when referral is ready [phone]      Kaye Hayden

## 2021-03-31 NOTE — TELEPHONE ENCOUNTER
Problems with veins in legs for years. Last 2 months painful in calf area   It comes and goes. Patient given the name and number for DR. Betsy Arredondo

## 2021-04-01 ENCOUNTER — TELEPHONE (OUTPATIENT)
Dept: FAMILY MEDICINE CLINIC | Age: 61
End: 2021-04-01

## 2021-04-01 DIAGNOSIS — M79.605 PAIN OF LEFT LOWER EXTREMITY: Primary | ICD-10-CM

## 2021-04-01 NOTE — TELEPHONE ENCOUNTER
----- Message from Alba Samreen sent at 3/31/2021 11:43 AM EDT -----  Regarding: Dr. Fito Stone  General Message/Vendor Calls    Caller's first and last name: pt      Reason for call: needs medical records/office notes faxed      Callback required yes/no and why: y      Best contact number(s): [phone]      Details to clarify the request: [facility] with Dr. Babatunde Wade and they needs some medical records faxed to their office.       [phone]    [phone]-[facility] number    Alba Samreen

## 2021-04-01 NOTE — TELEPHONE ENCOUNTER
Outbound call to [facility] (Dr. Jose R Covington office). Spoke with . Patients  verified. Informed the office we would need a request for the information they are needing (medication list, office notes). Best callback:  [phone]  Fax#   [phone]  (Dr. Jose R Covington office)        Outbound call to patient.  verified. Informed the patient she would have to go to Dr. Jose R Covington office to to fill out that form so they can fax it over to us. Patient noted understanding. Pt also wants to know if Dr. Curt Acosta can put a order in for a ultrasound for a venous doppler, patient wants to know If she has a blood clot in her left leg. Patient has a appointment with Dr. Ivonne Gutierrez on 2021. Please Advise.        Best [phone]

## 2021-06-30 ENCOUNTER — TELEPHONE (OUTPATIENT)
Dept: FAMILY MEDICINE CLINIC | Age: 61
End: 2021-06-30

## 2021-06-30 DIAGNOSIS — Z12.31 SCREENING MAMMOGRAM, ENCOUNTER FOR: Primary | ICD-10-CM

## 2021-06-30 NOTE — TELEPHONE ENCOUNTER
Patient called and requested mammogram referral. Pt does not want to make an appt.       [facility]# [phone]

## 2021-10-07 ENCOUNTER — HOSPITAL ENCOUNTER (OUTPATIENT)
Dept: MAMMOGRAPHY | Age: 61
Discharge: HOME | End: 2021-10-07
Attending: FAMILY MEDICINE
Payer: COMMERCIAL

## 2021-10-07 DIAGNOSIS — Z12.31 SCREENING MAMMOGRAM, ENCOUNTER FOR: ICD-10-CM

## 2021-10-07 PROCEDURE — 77063 BREAST TOMOSYNTHESIS BI: CPT

## 2021-10-08 NOTE — PROGRESS NOTES
FINDINGS: Bilateral digital screening mammography was performed and is  interpreted in conjunction with a computer assisted detection (CAD) system. Additionally, tomosynthesis of both breasts in the CC and MLO projections was  performed. No suspicious masses or calcifications are identified. There has been  no significant change.     IMPRESSION  BI-RADS 1: Negative. No mammographic evidence of malignancy.     RECOMMENDATIONS:  Next screening mammogram is recommended in one year.      The patient will be notified of these results.

## 2021-10-11 ENCOUNTER — OFFICE VISIT (OUTPATIENT)
Dept: FAMILY MEDICINE CLINIC | Age: 61
End: 2021-10-11
Payer: COMMERCIAL

## 2021-10-11 VITALS
HEIGHT: 65 IN | SYSTOLIC BLOOD PRESSURE: 136 MMHG | RESPIRATION RATE: 16 BRPM | WEIGHT: 227 LBS | BODY MASS INDEX: 37.82 KG/M2 | TEMPERATURE: 97.8 F | DIASTOLIC BLOOD PRESSURE: 88 MMHG | OXYGEN SATURATION: 98 % | HEART RATE: 74 BPM

## 2021-10-11 DIAGNOSIS — Z00.00 PHYSICAL EXAM: Primary | ICD-10-CM

## 2021-10-11 DIAGNOSIS — M79.605 LEFT LEG PAIN: ICD-10-CM

## 2021-10-11 DIAGNOSIS — Z12.11 COLON CANCER SCREENING: ICD-10-CM

## 2021-10-11 PROCEDURE — 99396 PREV VISIT EST AGE 40-64: CPT | Performed by: FAMILY MEDICINE

## 2021-10-11 RX ORDER — CALCIUM CARBONATE/VITAMIN D3 600MG-5MCG
1 TABLET ORAL
COMMUNITY

## 2021-10-11 NOTE — PROGRESS NOTES
HPI  Zayra Mueller 64 y.o. female  presents to the office today for CPE. Blood pressure 136/88, pulse 74, temperature 97.8 °F (36.6 °C), temperature source Temporal, resp. rate 16, height 5' 5\" (1.651 m), weight 227 lb (103 kg), last menstrual period 09/04/2011, SpO2 98 %. Body mass index is 37.77 kg/m². Chief Complaint   Patient presents with    Complete Physical      Skin: Pt complains of minor non specific skin lesions on her arms. I advised pt that it is nothing concerning. L Leg Pain: Pt states that like a year ago every now and then there is pain upon adduction of her L leg. She states that the pain is behind and underneath her L leg. She states that the sxs alleviate after resting for half an hour. I have advised pt to get an ultrasound by Sandra Olivera. I have referred pt to [facility], Sports Medicine. CPE: Pt presents today for a CPE, which I performed. All findings were normal. Pt presents today for a CPE, which I performed. All findings were normal. Of note,   pt has spider naevi noted on L lateral side of leg and no vericose veins were noted. Pt will have updated lab work performed during today's OV. Hypertension: BP at office today 158/84 and 136/88 on manual recheck. Pt is not currently on any anti-hypertensive medications. Pt endorses of having stress because of issues related to her mother having dementia. She states that she is planning on exercising more often. I have advised pt to change her dietary lifestyle and to eat at home more often. Pt will have updated lab work performed during today's OV. Health Maintenance:  Medicare questionnaire discussed and responses reviewed today in office. Pt denies of receiving Influenza vaccination. She reports that she has received both doses of her COVID-19 vaccination ([employer]). I have referred pt to , Gastroenterology for a Colonoscopy.         Current Outpatient Medications   Medication Sig Dispense Refill    calcium-vitamin D 600 mg(1,500mg) -200 unit tab Take 1 Tablet by mouth daily (with breakfast). Calcium      psyllium (METAMUCIL) powd Take  by mouth. [employer] brand      tacrolimus (PROTOPIC) 0.1 % ointment Apply  to affected area two (2) times a day. 30 g 0    multivitamin, tx-iron-ca-min (THERA-M w/ IRON) 9 mg iron-400 mcg tab tablet Take 1 Tab by mouth daily.  ERGOCALCIFEROL, VITAMIN D2, (VITAMIN D2 PO) Take  by mouth. No Known Allergies  Past Medical History:   Diagnosis Date    Abnormal Pap smear     Contact dermatitis and other eczema, due to unspecified cause     warts    Mixed hyperlipidemia      Past Surgical History:   Procedure Laterality Date    BIOPSY BREAST      HX BREAST BIOPSY Left 2004    benign     Family History   Problem Relation Age of Onset    Hypertension Mother     Lung Disease Father         COPD    Cancer Father 68        Lung    Breast Cancer Maternal Aunt     Breast Cancer Maternal Aunt      Social History     Tobacco Use    Smoking status: Former Smoker    Smokeless tobacco: Never Used   Substance Use Topics    Alcohol use: Yes     Comment: rare        Review of Systems   Constitutional: Negative for chills and fever. HENT: Negative for hearing loss and tinnitus. Eyes: Negative for blurred vision and double vision. Respiratory: Negative for cough and shortness of breath. Cardiovascular: Negative for chest pain and palpitations. Gastrointestinal: Negative for nausea and vomiting. Genitourinary: Negative for dysuria and frequency. Musculoskeletal: Negative for back pain and falls. Pain in backside of L leg   Skin: Negative for itching and rash. Minor non specific skin lesions on B arms. Neurological: Negative for dizziness, loss of consciousness and headaches. Endo/Heme/Allergies: Negative. Psychiatric/Behavioral: Negative for depression. The patient is not nervous/anxious.         Physical Exam  Constitutional:       Appearance: Normal appearance. HENT:      Head: Normocephalic and atraumatic. Right Ear: Tympanic membrane, ear canal and external ear normal.      Left Ear: Tympanic membrane, ear canal and external ear normal.      Nose: Nose normal.      Mouth/Throat:      Mouth: Mucous membranes are moist.      Pharynx: Oropharynx is clear. Eyes:      Extraocular Movements: Extraocular movements intact. Conjunctiva/sclera: Conjunctivae normal.      Pupils: Pupils are equal, round, and reactive to light. Cardiovascular:      Rate and Rhythm: Normal rate and regular rhythm. Pulses: Normal pulses. Heart sounds: Normal heart sounds. Pulmonary:      Effort: Pulmonary effort is normal.      Breath sounds: Normal breath sounds. Abdominal:      General: Abdomen is flat. Bowel sounds are normal.      Palpations: Abdomen is soft. Genitourinary:     General: Normal vulva. Rectum: Normal.   Musculoskeletal:         General: Normal range of motion. Legs:    Skin:     General: Skin is warm and dry. Neurological:      General: No focal deficit present. Mental Status: She is alert and oriented to person, place, and time. Mental status is at baseline. Psychiatric:         Mood and Affect: Mood normal.         Behavior: Behavior normal.         Judgment: Judgment normal.           ASSESSMENT and PLAN  Diagnoses and all orders for this visit:    1. Physical exam  Pt presents today for a CPE, which I performed. All findings were normal. Of note,   pt has spider naevi noted on L lateral side of leg and no vericose veins were noted. Pt will have updated lab work performed during today's [facility] 89; Future  -     CBC WITH AUTOMATED DIFF; Future  -     LIPID PANEL; Future  -     TSH 3RD GENERATION; Future  -     T4, FREE; Future  -     URINALYSIS W/MICROSCOPIC; Future    2. Colon cancer screening  I have referred pt to , Gastroenterology for a Colonoscopy.   -     REFERRAL TO GASTROENTEROLOGY    3. Left leg pain  Pt states that like a year ago every now and then there is pain upon adduction of her L leg. She states that the pain is behind and underneath her L leg. She states that the sxs alleviate after resting for half an hour. I have advised pt to get an ultrasound by [facility]. I have referred pt to , [address] [place] [address] E        Follow-up and Dispositions    · Return in about 6 months (around 4/11/2022) for physical.            Medication risks/benefits/costs/interactions/alternatives discussed with patient. Advised patient to call back or return to office if symptoms worsen/change/persist.  If patient cannot reach us or should anything more severe/urgent arise he/she should proceed directly to the nearest emergency department. Discussed expected course/resolution/complications of diagnosis in detail with patient. Patient given a written after visit summary which includes diagnoses, current medications and vitals. Patient expressed understanding with the diagnosis and plan. Written by kishor Valdovinos, as dictated by Ramos Pang M.D.    11:25 AM - 12:01 PM    Total time spent with the patient 36 minutes, greater than 50% of time spent counseling patient.

## 2021-10-11 NOTE — PROGRESS NOTES
Chief Complaint   Patient presents with    Complete Physical     1. Have you been to the ER, urgent care clinic since your last visit? Hospitalized since your last visit?no    2. Have you seen or consulted any other health care providers outside of the [address] [place] [address] since your last visit? Include any pap smears or colon screening. Derm ?      Pap - UTD   Mammogram- yes last week

## 2021-10-12 DIAGNOSIS — Z00.00 ROUTINE GENERAL MEDICAL EXAMINATION AT A HEALTH CARE FACILITY: Primary | ICD-10-CM

## 2021-10-13 LAB
ALBUMIN SERPL-MCNC: 3.9 G/DL (ref 3.5–5)
ALBUMIN/GLOB SERPL: 1.1 {RATIO} (ref 1.1–2.2)
ALP SERPL-CCNC: 98 U/L (ref 45–117)
ALT SERPL-CCNC: 32 U/L (ref 12–78)
ANION GAP SERPL CALC-SCNC: 4 MMOL/L (ref 5–15)
AST SERPL-CCNC: 20 U/L (ref 15–37)
BASOPHILS # BLD: 0 K/UL (ref 0–0.1)
BASOPHILS NFR BLD: 1 % (ref 0–1)
BILIRUB SERPL-MCNC: 0.6 MG/DL (ref 0.2–1)
BUN SERPL-MCNC: 15 MG/DL (ref 6–20)
BUN/CREAT SERPL: 18 (ref 12–20)
CALCIUM SERPL-MCNC: 9.4 MG/DL (ref 8.5–10.1)
CHLORIDE SERPL-SCNC: 104 MMOL/L (ref 97–108)
CHOLEST SERPL-MCNC: 229 MG/DL
CO2 SERPL-SCNC: 29 MMOL/L (ref 21–32)
CREAT SERPL-MCNC: 0.84 MG/DL (ref 0.55–1.02)
DIFFERENTIAL METHOD BLD: NORMAL
EOSINOPHIL # BLD: 0.1 K/UL (ref 0–0.4)
EOSINOPHIL NFR BLD: 2 % (ref 0–7)
ERYTHROCYTE [DISTWIDTH] IN BLOOD BY AUTOMATED COUNT: 13.2 % (ref 11.5–14.5)
GLOBULIN SER CALC-MCNC: 3.4 G/DL (ref 2–4)
GLUCOSE SERPL-MCNC: 83 MG/DL (ref 65–100)
HCT VFR BLD AUTO: 46.3 % (ref 35–47)
HDLC SERPL-MCNC: 68 MG/DL
HDLC SERPL: 3.4 {RATIO} (ref 0–5)
HGB BLD-MCNC: 14.7 G/DL (ref 11.5–16)
IMM GRANULOCYTES # BLD AUTO: 0 K/UL (ref 0–0.04)
IMM GRANULOCYTES NFR BLD AUTO: 0 % (ref 0–0.5)
LDLC SERPL CALC-MCNC: 145.2 MG/DL (ref 0–100)
LYMPHOCYTES # BLD: 2.4 K/UL (ref 0.8–3.5)
LYMPHOCYTES NFR BLD: 31 % (ref 12–49)
MCH RBC QN AUTO: 28.9 PG (ref 26–34)
MCHC RBC AUTO-ENTMCNC: 31.7 G/DL (ref 30–36.5)
MCV RBC AUTO: 91 FL (ref 80–99)
MONOCYTES # BLD: 0.5 K/UL (ref 0–1)
MONOCYTES NFR BLD: 7 % (ref 5–13)
NEUTS SEG # BLD: 4.6 K/UL (ref 1.8–8)
NEUTS SEG NFR BLD: 59 % (ref 32–75)
NRBC # BLD: 0 K/UL (ref 0–0.01)
NRBC BLD-RTO: 0 PER 100 WBC
PLATELET # BLD AUTO: 230 K/UL (ref 150–400)
PMV BLD AUTO: 11.5 FL (ref 8.9–12.9)
POTASSIUM SERPL-SCNC: 4.2 MMOL/L (ref 3.5–5.1)
PROT SERPL-MCNC: 7.3 G/DL (ref 6.4–8.2)
RBC # BLD AUTO: 5.09 M/UL (ref 3.8–5.2)
SODIUM SERPL-SCNC: 137 MMOL/L (ref 136–145)
T4 FREE SERPL-MCNC: 1.1 NG/DL (ref 0.8–1.5)
TRIGL SERPL-MCNC: 79 MG/DL (ref ?–150)
TSH SERPL DL<=0.05 MIU/L-ACNC: 0.98 UIU/ML (ref 0.36–3.74)
VLDLC SERPL CALC-MCNC: 15.8 MG/DL
WBC # BLD AUTO: 7.7 K/UL (ref 3.6–11)

## 2021-11-09 NOTE — PROGRESS NOTES
Ms. Jill Larios,  Your labs appear stable for the most part your LDL cholesterol needs to be low but lower than where it is please focus on diet exercise and weight loss when I see again hopefully we can see some more improvement. If you have any questions please let me know

## 2022-03-19 PROBLEM — M79.605 LEFT LEG PAIN: Status: ACTIVE | Noted: 2021-10-11

## 2022-03-19 PROBLEM — E66.01 SEVERE OBESITY (HCC): Status: ACTIVE | Noted: 2019-07-29

## 2022-09-09 ENCOUNTER — TRANSCRIBE ORDER (OUTPATIENT)
Dept: SCHEDULING | Age: 62
End: 2022-09-09

## 2022-09-09 DIAGNOSIS — Z12.31 ENCOUNTER FOR MAMMOGRAM TO ESTABLISH BASELINE MAMMOGRAM: Primary | ICD-10-CM

## 2022-10-17 ENCOUNTER — OFFICE VISIT (OUTPATIENT)
Dept: FAMILY MEDICINE CLINIC | Age: 62
End: 2022-10-17
Payer: COMMERCIAL

## 2022-10-17 VITALS
TEMPERATURE: 97.9 F | HEIGHT: 65 IN | SYSTOLIC BLOOD PRESSURE: 130 MMHG | HEART RATE: 70 BPM | RESPIRATION RATE: 16 BRPM | DIASTOLIC BLOOD PRESSURE: 80 MMHG | WEIGHT: 209 LBS | BODY MASS INDEX: 34.82 KG/M2 | OXYGEN SATURATION: 97 %

## 2022-10-17 DIAGNOSIS — Z00.00 PHYSICAL EXAM: Primary | ICD-10-CM

## 2022-10-17 DIAGNOSIS — E55.9 VITAMIN D DEFICIENCY: ICD-10-CM

## 2022-10-17 PROCEDURE — 99396 PREV VISIT EST AGE 40-64: CPT | Performed by: FAMILY MEDICINE

## 2022-10-17 NOTE — LETTER
11/30/2022 11:48 AM    Ms. Quentin Orellana [id number]        The patient has not viewed the following result(s) yet.      Contains abnormal data LIPID PANEL  Order: [id number]   Status: Final result     Visible to patient: Yes (not seen)     Next appt: None     Dx: Physical exam     1 Result Note     1 Patient Communication     1  Topic  Component Ref Range & Units 1 mo ago 1 yr ago 2 yr ago 3 yr ago 4 yr ago 6 yr ago 7 yr ago   Cholesterol, total <200 MG/ High   229 High   224 High  R  205 High  R  197 R  216 High  R  204 High  R    Triglyceride <150 MG/DL 85  79 CM  76 R  90 R  114 R  104 R  81 R    Comment: Based on NCEP-ATP III:  Triglycerides <150 mg/dL  is considered normal, 150-199 mg/dL  borderline high,  200-499 mg/dL high and  greater than or equal to 500 mg/dL very high. HDL Cholesterol MG/DL 62  68 CM  69 R  57 R  56 R  63 R, CM  63 R, CM    Comment: Based on NCEP ATP III, HDL Cholesterol <40 mg/dL is considered low and >60 mg/dL is elevated.    LDL, calculated 0 - 100 MG/ High   145.2 High  CM  140 High  R  130 High  R  118 High  R  132 High  R  125 High  R    Comment: Based on the NCEP-ATP: LDL-C concentrations are considered  optimal <100 mg/dL,   near optimal/above Normal 100-129 mg/dL   Borderline High: 130-159, High: 160-189 mg/dL   Very High: Greater than or equal to 190 mg/dL    VLDL, calculated MG/DL 17  15.8  15 R  18 R  23 R  21 R  16 R    CHOL/HDL Ratio 0.0 - 5.0   4.0  3.4         [facility] [place] 01 01 01 01 01              Specimen Collected: 10/17/22 12:39 Last Resulted: 10/18/22 02:39         Lab Flowsheet      Order Details      View Encounter      Lab and Collection Details      Routing      Result History     View Encounter Conversation        CM=Additional comments  R=Reference range differs from displayed range        Result Care Coordination      Result Notes   Gokul Bravo MD   11/20/2022 11:27 PM EST Back to Top      No additional comment          Patient Communication   Released  Not seen Back to Top    Please work on your diet and exercise your LDL cholesterol is a bit elevated we need that to be less than 130.     When I see you again we will recheck those labs. ... Written by Payton Lopez MD on 11/20/2022 11:27 PM EST View Full Comments       Satisfied Health Maintenance Topics       Back to Top  Lipid Screen (Every 5 Years)  Next due on 10/17/2027  Address Topic            Contains abnormal data CBC WITH AUTOMATED DIFF  Order: [id number]   Status: Final result     Visible to patient: Yes (not seen)     Next appt: None     Dx: Physical exam     1 Result Note     1 Patient Communication  Component Ref Range & Units 1 mo ago 1 yr ago 2 yr ago 3 yr ago 4 yr ago 6 yr ago 7 yr ago   WBC 3.6 - 11.0 K/uL 8.1  7.7  7.3 R  8.9 R  8.6 R  6.4 R  6.5 R    RBC 3.80 - 5.20 M/uL 5.29 High   5.09  5.09 R  5.00 R  5.09 R  5.06 R  5.04 R    HGB 11.5 - 16.0 g/dL 15.7  14.7  14.9 R  14.3 R  14.8 R  14.5 R  14.7 R    HCT 35.0 - 47.0 % 48.2 High   46.3  43.1 R  43.1 R  45.1 R  43.9 R  43.9 R    MCV 80.0 - 99.0 FL 91.1  91.0  85 R  86 R  89 R  87 R  87 R    MCH 26.0 - 34.0 PG 29.7  28.9  29.3 R  28.6 R  29.1 R  28.7 R  29.2 R    MCHC 30.0 - 36.5 g/dL 32.6  31.7  34.6 R  33.2 R  32.8 R  33.0 R  33.5 R    RDW 11.5 - 14.5 % 13.6  13.2  12.3 R  12.9 R  13.6 R  13.8 R  13.4 R    PLATELET 306 - 888 K/uL 207  230  208 R  223 R  243 R  221 R  222 R    MPV 8.9 - 12.9 FL 11.3  11.5         NRBC 0  WBC 0.0  0.0         ABSOLUTE NRBC 0.00 - 0.01 K/uL 0.00  0.00         NEUTROPHILS 32 - 75 % 62  59  61 R   62 R  52 R  57 R    LYMPHOCYTES 12 - 49 % 30  31         MONOCYTES 5 - 13 % 7  7  7 R   7 R  8 R  8 R    EOSINOPHILS 0 - 7 % 1  2  1 R   1 R  3 R  3 R    BASOPHILS 0 - 1 % 0  1  1 R   0 R  0 R  0 R    IMMATURE GRANULOCYTES 0.0 - 0.5 % 0  0  0 R   0 R  0 R  0 R    ABS. NEUTROPHILS 1.8 - 8.0 K/UL 4.9  4.6  4.4 R   5.3 R  3.3 R  3.7 R    ABS. LYMPHOCYTES 0.8 - 3.5 K/UL 2.4  2.4         ABS. MONOCYTES 0.0 - 1.0 K/UL 0.6  0.5  0.5 R   0.6 R  0.5 R  0.5 R    ABS. EOSINOPHILS 0.0 - 0.4 K/UL 0.1  0.1  0.1 R   0.1 R  0.2 R  0.2 R    ABS. BASOPHILS 0.0 - 0.1 K/UL 0.0  0.0  0.1 R   0.0 R  0.0 R  0.0 R    ABS. IMM. GRANS. 0.00 - 0.04 K/UL 0.0  0.0  0.0 R   0.0 R  0.0 R  0.0 R    DF   AUTOMATED  AUTOMATED         Resulting Agency  [facility] 01 01 01 01 01              Specimen Collected: 10/17/22 12:39 Last Resulted: 10/18/22 02:39         Lab Flowsheet      Order Details      View Encounter      Lab and Collection Details      Routing      Result History     View Encounter Conversation        R=Reference range differs from displayed range        Result Care Coordination      Result Notes   Doorn Fabian MD   11/20/2022 11:27 PM EST Back to Top      No additional comment          Patient Communication   Released  Not seen Back to Top    Please work on your diet and exercise your LDL cholesterol is a bit elevated we need that to be less than 130.     When I see you again we will recheck those labs. ...    Written by Doron Fabian MD on 11/20/2022 11:27 PM EST View Full Comments        Status of Other Orders    Completed     METABOLIC PANEL, COMPREHENSIVE  10/18/22   TSH 3RD GENERATION  10/18/22   T4, FREE  10/18/22   URINALYSIS W/MICROSCOPIC  10/18/22   VITAMIN D, 25 HYDROXY  10/18/22           Sincerely,      Doron Fabian MD

## 2022-10-17 NOTE — PROGRESS NOTES
Chief Complaint   Patient presents with    Complete Physical     1. \"Have you been to the ER, urgent care clinic since your last visit? Hospitalized since your last visit? \"   no  2. \"Have you seen or consulted any other health care providers outside of the [address] [place] [address] since your last visit? \"  no    3. For patients aged 39-70: Has the patient had a colonoscopy / FIT/ Cologuard? No gap       If the patient is female:    4. For patients aged 41-77: Has the patient had a mammogram within the past 2 years? No gap       5. For patients aged 21-65: Has the patient had a pap smear?  No gap

## 2022-10-17 NOTE — PROGRESS NOTES
GONZÁLEZ Velez 58 y.o. female  presents to the office today for CPE. Blood pressure 130/80, pulse 70, temperature 97.9 °F (36.6 °C), temperature source Temporal, resp. rate 16, height 5' 5\" (1.651 m), weight 209 lb (94.8 kg), last menstrual period 09/04/2011, SpO2 97 %. Body mass index is 34.78 kg/m². Chief Complaint   Patient presents with    Complete Physical      CPE: Pt presents today for a CPE, which I performed. All findings were normal. Pt's weight today is 209lb, down from 227lb on 10/11/21. She eats about 1300kcal daily. Vitamin D deficiency: Pt's vitamin D levels were 26.1 on 8/19/20. Pt reports occasional sporadic increased heart rate at rest. She denies CP, SOB, or pain/numbness radiating down left arm. She does have stress that may cause this. She will continue to monitor this. Pt has been taking Vital Protein powder for 6 months. She has not noticed a difference since starting. She will do a challenge test to see if she notices a difference. Pt's aunt was recently diagnosed with ovarian cancer. Health Maintenance:   Pt is not interested in flu or COVID vaccines today, but she will consider getting them. She is scheduled for a mammogram.     Current Outpatient Medications   Medication Sig Dispense Refill    OTHER Vital protein every other day      calcium-vitamin D 600 mg(1,500mg) -200 unit tab Take 1 Tablet by mouth daily (with breakfast). Calcium      psyllium (METAMUCIL) powd Take  by mouth. [employer] brand      multivitamin, tx-iron-ca-min (THERA-M w/ IRON) 9 mg iron-400 mcg tab tablet Take 1 Tab by mouth daily. ERGOCALCIFEROL, VITAMIN D2, (VITAMIN D2 PO) Take  by mouth.        No Known Allergies  Past Medical History:   Diagnosis Date    Abnormal Pap smear     Contact dermatitis and other eczema, due to unspecified cause     warts    Mixed hyperlipidemia      Past Surgical History:   Procedure Laterality Date    BIOPSY BREAST      HX BREAST BIOPSY Left 2004 benign     Family History   Problem Relation Age of Onset    Hypertension Mother     Lung Disease Father         COPD    Cancer Father 68        Lung    Breast Cancer Maternal Aunt     Breast Cancer Maternal Aunt      Social History     Tobacco Use    Smoking status: Former    Smokeless tobacco: Never   Substance Use Topics    Alcohol use: Yes     Comment: rare        Review of Systems   Constitutional:  Negative for chills and fever. HENT:  Negative for hearing loss and tinnitus. Eyes:  Negative for blurred vision and double vision. Respiratory:  Negative for cough and shortness of breath. Cardiovascular:  Negative for chest pain and palpitations. Gastrointestinal:  Negative for nausea and vomiting. Genitourinary:  Negative for dysuria and frequency. Musculoskeletal:  Negative for back pain and falls. Skin:  Negative for itching and rash. Neurological:  Negative for dizziness, loss of consciousness and headaches. Endo/Heme/Allergies: Negative. Psychiatric/Behavioral:  Negative for depression. The patient is not nervous/anxious. Physical Exam  Vitals and nursing note reviewed. Constitutional:       General: She is not in acute distress. Appearance: She is well-developed. She is not diaphoretic. HENT:      Head: Normocephalic and atraumatic. Cardiovascular:      Rate and Rhythm: Normal rate and regular rhythm. Heart sounds: Normal heart sounds. No murmur heard. No friction rub. No gallop. Pulmonary:      Effort: Pulmonary effort is normal. No respiratory distress. Breath sounds: Normal breath sounds. No wheezing or rales. Chest:      Chest wall: No tenderness. Abdominal:      General: Bowel sounds are normal. There is no distension. Palpations: Abdomen is soft. Tenderness: There is no abdominal tenderness. Musculoskeletal:         General: Normal range of motion. Cervical back: Normal range of motion and neck supple.    Neurological: Mental Status: She is alert and oriented to person, place, and time. Psychiatric:         Behavior: Behavior normal.         Thought Content: Thought content normal.         Judgment: Judgment normal.         ASSESSMENT and PLAN  Diagnoses and all orders for this visit:    1. Physical exam  Pt presents today for a CPE, which I performed. All findings were normal. Pt will have updated lab work performed during [facility]'s [facility]; Future  -     CBC WITH AUTOMATED DIFF; Future  -     LIPID PANEL; Future  -     TSH 3RD GENERATION; Future  -     T4, FREE; Future  -     URINALYSIS W/MICROSCOPIC; Future  -     VITAMIN D, 25 HYDROXY; Future    2. Vitamin D deficiency  -     VITAMIN D, 25 HYDROXY; Future    Follow-up and Dispositions    Return in about 1 year (around 10/17/2023) for physical.          Medication risks/benefits/costs/interactions/alternatives discussed with patient. Advised patient to call back or return to office if symptoms worsen/change/persist.  If patient cannot reach us or should anything more severe/urgent arise he/she should proceed directly to the nearest emergency department. Discussed expected course/resolution/complications of diagnosis in detail with patient. Patient given a written after visit summary which includes diagnoses, current medications and vitals. Patient expressed understanding with the diagnosis and plan. Written by kishor Zavala, as dictated by Chapo Clancy M.D.    12:10 PM - 12:33 PM    Total time spent with the patient 25 minutes, greater than 50% of time spent counseling patient.

## 2022-10-18 LAB
25(OH)D3 SERPL-MCNC: 32 NG/ML (ref 30–100)
ALBUMIN SERPL-MCNC: 4.1 G/DL (ref 3.5–5)
ALBUMIN/GLOB SERPL: 1.2 {RATIO} (ref 1.1–2.2)
ALP SERPL-CCNC: 99 U/L (ref 45–117)
ALT SERPL-CCNC: 36 U/L (ref 12–78)
ANION GAP SERPL CALC-SCNC: 7 MMOL/L (ref 5–15)
APPEARANCE UR: CLEAR
AST SERPL-CCNC: 20 U/L (ref 15–37)
BACTERIA URNS QL MICRO: NEGATIVE /HPF
BASOPHILS # BLD: 0 K/UL (ref 0–0.1)
BASOPHILS NFR BLD: 0 % (ref 0–1)
BILIRUB SERPL-MCNC: 0.7 MG/DL (ref 0.2–1)
BILIRUB UR QL: NEGATIVE
BUN SERPL-MCNC: 14 MG/DL (ref 6–20)
BUN/CREAT SERPL: 18 (ref 12–20)
CALCIUM SERPL-MCNC: 9.6 MG/DL (ref 8.5–10.1)
CHLORIDE SERPL-SCNC: 106 MMOL/L (ref 97–108)
CHOLEST SERPL-MCNC: 247 MG/DL
CO2 SERPL-SCNC: 27 MMOL/L (ref 21–32)
COLOR UR: NORMAL
CREAT SERPL-MCNC: 0.79 MG/DL (ref 0.55–1.02)
DIFFERENTIAL METHOD BLD: ABNORMAL
EOSINOPHIL # BLD: 0.1 K/UL (ref 0–0.4)
EOSINOPHIL NFR BLD: 1 % (ref 0–7)
EPITH CASTS URNS QL MICRO: NORMAL /LPF
ERYTHROCYTE [DISTWIDTH] IN BLOOD BY AUTOMATED COUNT: 13.6 % (ref 11.5–14.5)
GLOBULIN SER CALC-MCNC: 3.3 G/DL (ref 2–4)
GLUCOSE SERPL-MCNC: 74 MG/DL (ref 65–100)
GLUCOSE UR STRIP.AUTO-MCNC: NEGATIVE MG/DL
HCT VFR BLD AUTO: 48.2 % (ref 35–47)
HDLC SERPL-MCNC: 62 MG/DL
HDLC SERPL: 4 {RATIO} (ref 0–5)
HGB BLD-MCNC: 15.7 G/DL (ref 11.5–16)
HGB UR QL STRIP: NEGATIVE
IMM GRANULOCYTES # BLD AUTO: 0 K/UL (ref 0–0.04)
IMM GRANULOCYTES NFR BLD AUTO: 0 % (ref 0–0.5)
KETONES UR QL STRIP.AUTO: NEGATIVE MG/DL
LDLC SERPL CALC-MCNC: 168 MG/DL (ref 0–100)
LEUKOCYTE ESTERASE UR QL STRIP.AUTO: NEGATIVE
LYMPHOCYTES # BLD: 2.4 K/UL (ref 0.8–3.5)
LYMPHOCYTES NFR BLD: 30 % (ref 12–49)
MCH RBC QN AUTO: 29.7 PG (ref 26–34)
MCHC RBC AUTO-ENTMCNC: 32.6 G/DL (ref 30–36.5)
MCV RBC AUTO: 91.1 FL (ref 80–99)
MONOCYTES # BLD: 0.6 K/UL (ref 0–1)
MONOCYTES NFR BLD: 7 % (ref 5–13)
NEUTS SEG # BLD: 4.9 K/UL (ref 1.8–8)
NEUTS SEG NFR BLD: 62 % (ref 32–75)
NITRITE UR QL STRIP.AUTO: NEGATIVE
NRBC # BLD: 0 K/UL (ref 0–0.01)
NRBC BLD-RTO: 0 PER 100 WBC
PH UR STRIP: 5.5 [PH] (ref 5–8)
PLATELET # BLD AUTO: 207 K/UL (ref 150–400)
PMV BLD AUTO: 11.3 FL (ref 8.9–12.9)
POTASSIUM SERPL-SCNC: 4.7 MMOL/L (ref 3.5–5.1)
PROT SERPL-MCNC: 7.4 G/DL (ref 6.4–8.2)
PROT UR STRIP-MCNC: NEGATIVE MG/DL
RBC # BLD AUTO: 5.29 M/UL (ref 3.8–5.2)
RBC #/AREA URNS HPF: NORMAL /HPF (ref 0–5)
SODIUM SERPL-SCNC: 140 MMOL/L (ref 136–145)
SP GR UR REFRACTOMETRY: 1 (ref 1–1.03)
T4 FREE SERPL-MCNC: 1.1 NG/DL (ref 0.8–1.5)
TRIGL SERPL-MCNC: 85 MG/DL (ref ?–150)
TSH SERPL DL<=0.05 MIU/L-ACNC: 1.17 UIU/ML (ref 0.36–3.74)
UROBILINOGEN UR QL STRIP.AUTO: 0.2 EU/DL (ref 0.2–1)
VLDLC SERPL CALC-MCNC: 17 MG/DL
WBC # BLD AUTO: 8.1 K/UL (ref 3.6–11)
WBC URNS QL MICRO: NORMAL /HPF (ref 0–4)

## 2022-10-20 ENCOUNTER — HOSPITAL ENCOUNTER (OUTPATIENT)
Dept: MAMMOGRAPHY | Age: 62
Discharge: HOME | End: 2022-10-20
Attending: FAMILY MEDICINE
Payer: COMMERCIAL

## 2022-10-20 DIAGNOSIS — Z12.31 ENCOUNTER FOR MAMMOGRAM TO ESTABLISH BASELINE MAMMOGRAM: ICD-10-CM

## 2022-10-20 PROCEDURE — 77063 BREAST TOMOSYNTHESIS BI: CPT

## 2023-08-14 ENCOUNTER — TRANSCRIBE ORDERS (OUTPATIENT)
Facility: HOSPITAL | Age: 63
End: 2023-08-14

## 2023-08-14 DIAGNOSIS — Z12.31 SCREENING MAMMOGRAM FOR BREAST CANCER: Primary | ICD-10-CM

## 2023-11-29 ENCOUNTER — HOSPITAL ENCOUNTER (OUTPATIENT)
Facility: HOSPITAL | Age: 63
Discharge: HOME | End: 2023-12-02
Attending: FAMILY MEDICINE
Payer: COMMERCIAL

## 2023-11-29 DIAGNOSIS — Z12.31 SCREENING MAMMOGRAM FOR BREAST CANCER: ICD-10-CM

## 2023-11-29 PROCEDURE — 77063 BREAST TOMOSYNTHESIS BI: CPT

## 2023-12-15 ENCOUNTER — HOSPITAL ENCOUNTER (OUTPATIENT)
Facility: HOSPITAL | Age: 63
Discharge: HOME | End: 2023-12-15
Attending: FAMILY MEDICINE
Payer: COMMERCIAL

## 2023-12-15 VITALS — WEIGHT: 200 LBS | HEIGHT: 65 IN | BODY MASS INDEX: 33.32 KG/M2

## 2023-12-15 DIAGNOSIS — R92.8 ABNORMAL MAMMOGRAM OF RIGHT BREAST: ICD-10-CM

## 2023-12-15 PROCEDURE — G0279 TOMOSYNTHESIS, MAMMO: HCPCS

## 2024-04-16 ENCOUNTER — OFFICE VISIT (OUTPATIENT)
Age: 64
End: 2024-04-16
Payer: COMMERCIAL

## 2024-04-16 ENCOUNTER — HOSPITAL ENCOUNTER (OUTPATIENT)
Facility: HOSPITAL | Age: 64
Setting detail: SPECIMEN
Discharge: HOME | End: 2024-04-19
Payer: COMMERCIAL

## 2024-04-16 VITALS
DIASTOLIC BLOOD PRESSURE: 82 MMHG | SYSTOLIC BLOOD PRESSURE: 132 MMHG | RESPIRATION RATE: 16 BRPM | HEIGHT: 65 IN | TEMPERATURE: 97.9 F | BODY MASS INDEX: 36.15 KG/M2 | WEIGHT: 217 LBS | OXYGEN SATURATION: 96 % | HEART RATE: 66 BPM

## 2024-04-16 DIAGNOSIS — Z01.419 ENCOUNTER FOR GYNECOLOGICAL EXAMINATION WITHOUT ABNORMAL FINDING: ICD-10-CM

## 2024-04-16 DIAGNOSIS — E55.9 VITAMIN D DEFICIENCY: ICD-10-CM

## 2024-04-16 DIAGNOSIS — Z11.4 SCREENING FOR HIV WITHOUT PRESENCE OF RISK FACTORS: ICD-10-CM

## 2024-04-16 DIAGNOSIS — Z00.00 PHYSICAL EXAM: Primary | ICD-10-CM

## 2024-04-16 DIAGNOSIS — E66.9 OBESITY, CLASS II, BMI 35-39.9: ICD-10-CM

## 2024-04-16 PROCEDURE — 99396 PREV VISIT EST AGE 40-64: CPT | Performed by: FAMILY MEDICINE

## 2024-04-16 PROCEDURE — 88175 CYTOPATH C/V AUTO FLUID REDO: CPT

## 2024-04-16 PROCEDURE — 87624 HPV HI-RISK TYP POOLED RSLT: CPT

## 2024-04-16 RX ORDER — M-VIT,TX,IRON,MINS/CALC/FOLIC 27MG-0.4MG
1 TABLET ORAL DAILY
COMMUNITY

## 2024-04-16 SDOH — ECONOMIC STABILITY: INCOME INSECURITY: HOW HARD IS IT FOR YOU TO PAY FOR THE VERY BASICS LIKE FOOD, HOUSING, MEDICAL CARE, AND HEATING?: NOT HARD AT ALL

## 2024-04-16 SDOH — ECONOMIC STABILITY: FOOD INSECURITY: WITHIN THE PAST 12 MONTHS, THE FOOD YOU BOUGHT JUST DIDN'T LAST AND YOU DIDN'T HAVE MONEY TO GET MORE.: NEVER TRUE

## 2024-04-16 SDOH — ECONOMIC STABILITY: FOOD INSECURITY: WITHIN THE PAST 12 MONTHS, YOU WORRIED THAT YOUR FOOD WOULD RUN OUT BEFORE YOU GOT MONEY TO BUY MORE.: NEVER TRUE

## 2024-04-16 SDOH — ECONOMIC STABILITY: HOUSING INSECURITY
IN THE LAST 12 MONTHS, WAS THERE A TIME WHEN YOU DID NOT HAVE A STEADY PLACE TO SLEEP OR SLEPT IN A SHELTER (INCLUDING NOW)?: NO

## 2024-04-16 ASSESSMENT — PATIENT HEALTH QUESTIONNAIRE - PHQ9
SUM OF ALL RESPONSES TO PHQ9 QUESTIONS 1 & 2: 0
SUM OF ALL RESPONSES TO PHQ QUESTIONS 1-9: 0
SUM OF ALL RESPONSES TO PHQ QUESTIONS 1-9: 0
1. LITTLE INTEREST OR PLEASURE IN DOING THINGS: NOT AT ALL
SUM OF ALL RESPONSES TO PHQ QUESTIONS 1-9: 0
SUM OF ALL RESPONSES TO PHQ QUESTIONS 1-9: 0
2. FEELING DOWN, DEPRESSED OR HOPELESS: NOT AT ALL

## 2024-04-16 ASSESSMENT — ENCOUNTER SYMPTOMS
COUGH: 0
NAUSEA: 0
BACK PAIN: 0
VOMITING: 0
SHORTNESS OF BREATH: 0

## 2024-04-16 NOTE — PROGRESS NOTES
HPI  Sean Greene 63 y.o. female  presents to the office today for annual exam and pap.     Blood pressure 132/82, pulse 66, temperature 97.9 °F (36.6 °C), temperature source Temporal, resp. rate 16, height 1.651 m (5' 5\"), weight 98.4 kg (217 lb), SpO2 96 %. Body mass index is 36.11 kg/m².   Chief Complaint   Patient presents with    Annual Exam     LOV with Jose 2022       She is overall doing well. She doesn't take any prescription medications. She would like to discuss possibly starting a medication for weight loss. No complaints today.     Current Outpatient Medications   Medication Sig Dispense Refill    Multiple Vitamins-Minerals (THERAPEUTIC MULTIVITAMIN-MINERALS) tablet Take 1 tablet by mouth daily      Calcium Carbonate-Vitamin D 600-5 MG-MCG TABS Take 1 tablet by mouth daily (with breakfast)       No current facility-administered medications for this visit.     No Known Allergies  Past Medical History:   Diagnosis Date    Abnormal Pap smear     Contact dermatitis and other eczema, due to unspecified cause     warts    Mixed hyperlipidemia      Past Surgical History:   Procedure Laterality Date    BIOPSY BREAST      BREAST BIOPSY Left 2004    benign, surgical     Family History   Problem Relation Age of Onset    Hypertension Mother     Breast Cancer Maternal Aunt     Lung Disease Father         COPD    Cancer Father 76        Lung    Breast Cancer Maternal Aunt      Social History     Tobacco Use    Smoking status: Former    Smokeless tobacco: Never   Substance Use Topics    Alcohol use: Yes        Review of Systems   Constitutional:  Negative for chills and fever.   HENT:  Negative for hearing loss and tinnitus.    Eyes:  Negative for visual disturbance.   Respiratory:  Negative for cough and shortness of breath.    Cardiovascular:  Negative for chest pain and palpitations.   Gastrointestinal:  Negative for nausea and vomiting.   Genitourinary:  Negative for dysuria and frequency.   Musculoskeletal:

## 2024-04-16 NOTE — PROGRESS NOTES
Chief Complaint   Patient presents with    Annual Exam     LOV with [place] 2022      \"Have you been to the ER, urgent care clinic since your last visit?  Hospitalized since your last visit?\"    no    “Have you seen or consulted any other health care providers outside of [facility] since your last visit?”    Derm - eczema     Last pap 2019         Click Here for Release of Records Request

## 2024-04-17 LAB
25(OH)D3 SERPL-MCNC: 27.4 NG/ML (ref 30–100)
ALBUMIN SERPL-MCNC: 3.8 G/DL (ref 3.5–5)
ALBUMIN/GLOB SERPL: 1.2 (ref 1.1–2.2)
ALP SERPL-CCNC: 96 U/L (ref 45–117)
ALT SERPL-CCNC: 26 U/L (ref 12–78)
ANION GAP SERPL CALC-SCNC: 6 MMOL/L (ref 5–15)
APPEARANCE UR: CLEAR
AST SERPL-CCNC: 16 U/L (ref 15–37)
BACTERIA URNS QL MICRO: NEGATIVE /HPF
BILIRUB SERPL-MCNC: 0.9 MG/DL (ref 0.2–1)
BILIRUB UR QL: NEGATIVE
BUN SERPL-MCNC: 12 MG/DL (ref 6–20)
BUN/CREAT SERPL: 15 (ref 12–20)
CALCIUM SERPL-MCNC: 9.5 MG/DL (ref 8.5–10.1)
CHLORIDE SERPL-SCNC: 102 MMOL/L (ref 97–108)
CHOLEST SERPL-MCNC: 223 MG/DL
CO2 SERPL-SCNC: 28 MMOL/L (ref 21–32)
COLOR UR: ABNORMAL
CREAT SERPL-MCNC: 0.8 MG/DL (ref 0.55–1.02)
EPITH CASTS URNS QL MICRO: ABNORMAL /LPF
ERYTHROCYTE [DISTWIDTH] IN BLOOD BY AUTOMATED COUNT: 13.1 % (ref 11.5–14.5)
GLOBULIN SER CALC-MCNC: 3.1 G/DL (ref 2–4)
GLUCOSE SERPL-MCNC: 78 MG/DL (ref 65–100)
GLUCOSE UR STRIP.AUTO-MCNC: NEGATIVE MG/DL
HCT VFR BLD AUTO: 43 % (ref 35–47)
HDLC SERPL-MCNC: 73 MG/DL
HDLC SERPL: 3.1 (ref 0–5)
HGB BLD-MCNC: 14 G/DL (ref 11.5–16)
HGB UR QL STRIP: NEGATIVE
HIV 1+2 AB+HIV1 P24 AG SERPL QL IA: NONREACTIVE
HIV 1/2 RESULT COMMENT: NORMAL
HYALINE CASTS URNS QL MICRO: ABNORMAL /LPF (ref 0–5)
KETONES UR QL STRIP.AUTO: NEGATIVE MG/DL
LDLC SERPL CALC-MCNC: 139.2 MG/DL (ref 0–100)
LEUKOCYTE ESTERASE UR QL STRIP.AUTO: ABNORMAL
MCH RBC QN AUTO: 29 PG (ref 26–34)
MCHC RBC AUTO-ENTMCNC: 32.6 G/DL (ref 30–36.5)
MCV RBC AUTO: 89 FL (ref 80–99)
NITRITE UR QL STRIP.AUTO: NEGATIVE
NRBC # BLD: 0 K/UL (ref 0–0.01)
NRBC BLD-RTO: 0 PER 100 WBC
PH UR STRIP: 6.5 (ref 5–8)
PLATELET # BLD AUTO: 206 K/UL (ref 150–400)
PMV BLD AUTO: 10.9 FL (ref 8.9–12.9)
POTASSIUM SERPL-SCNC: 4 MMOL/L (ref 3.5–5.1)
PROT SERPL-MCNC: 6.9 G/DL (ref 6.4–8.2)
PROT UR STRIP-MCNC: NEGATIVE MG/DL
RBC # BLD AUTO: 4.83 M/UL (ref 3.8–5.2)
RBC #/AREA URNS HPF: ABNORMAL /HPF (ref 0–5)
SODIUM SERPL-SCNC: 136 MMOL/L (ref 136–145)
SP GR UR REFRACTOMETRY: <1.005 (ref 1–1.03)
T4 FREE SERPL-MCNC: 1.2 NG/DL (ref 0.8–1.5)
TRIGL SERPL-MCNC: 54 MG/DL
TSH SERPL DL<=0.05 MIU/L-ACNC: 1.26 UIU/ML (ref 0.36–3.74)
URINE CULTURE IF INDICATED: ABNORMAL
UROBILINOGEN UR QL STRIP.AUTO: 0.2 EU/DL (ref 0.2–1)
VLDLC SERPL CALC-MCNC: 10.8 MG/DL
WBC # BLD AUTO: 8.1 K/UL (ref 3.6–11)
WBC URNS QL MICRO: ABNORMAL /HPF (ref 0–4)

## 2024-10-23 ENCOUNTER — TRANSCRIBE ORDERS (OUTPATIENT)
Facility: HOSPITAL | Age: 64
End: 2024-10-23

## 2024-10-23 DIAGNOSIS — Z12.31 SCREENING MAMMOGRAM FOR BREAST CANCER: Primary | ICD-10-CM

## 2025-06-10 ENCOUNTER — TRANSCRIBE ORDERS (OUTPATIENT)
Dept: SCHEDULING | Age: 65
End: 2025-06-10

## 2025-06-10 DIAGNOSIS — Z12.31 SCREENING MAMMOGRAM, ENCOUNTER FOR: Primary | ICD-10-CM

## 2025-06-17 ENCOUNTER — OFFICE VISIT (OUTPATIENT)
Age: 65
End: 2025-06-17
Payer: COMMERCIAL

## 2025-06-17 ENCOUNTER — TELEPHONE (OUTPATIENT)
Age: 65
End: 2025-06-17

## 2025-06-17 VITALS
HEART RATE: 56 BPM | SYSTOLIC BLOOD PRESSURE: 130 MMHG | RESPIRATION RATE: 18 BRPM | OXYGEN SATURATION: 97 % | TEMPERATURE: 97 F | WEIGHT: 212.8 LBS | DIASTOLIC BLOOD PRESSURE: 60 MMHG | HEIGHT: 65 IN | BODY MASS INDEX: 35.45 KG/M2

## 2025-06-17 DIAGNOSIS — J40 BRONCHITIS: Primary | ICD-10-CM

## 2025-06-17 DIAGNOSIS — R05.1 ACUTE COUGH: ICD-10-CM

## 2025-06-17 PROCEDURE — 99213 OFFICE O/P EST LOW 20 MIN: CPT | Performed by: FAMILY MEDICINE

## 2025-06-17 RX ORDER — GUAIFENESIN 100 MG/5ML
300 SOLUTION ORAL 3 TIMES DAILY PRN
Qty: 473 ML | Refills: 0 | Status: SHIPPED | OUTPATIENT
Start: 2025-06-17

## 2025-06-17 RX ORDER — AZITHROMYCIN 250 MG/1
TABLET, FILM COATED ORAL
Qty: 6 TABLET | Refills: 0 | Status: SHIPPED | OUTPATIENT
Start: 2025-06-17 | End: 2025-06-27

## 2025-06-17 SDOH — ECONOMIC STABILITY: FOOD INSECURITY: WITHIN THE PAST 12 MONTHS, THE FOOD YOU BOUGHT JUST DIDN'T LAST AND YOU DIDN'T HAVE MONEY TO GET MORE.: NEVER TRUE

## 2025-06-17 SDOH — ECONOMIC STABILITY: FOOD INSECURITY: WITHIN THE PAST 12 MONTHS, YOU WORRIED THAT YOUR FOOD WOULD RUN OUT BEFORE YOU GOT MONEY TO BUY MORE.: NEVER TRUE

## 2025-06-17 ASSESSMENT — PATIENT HEALTH QUESTIONNAIRE - PHQ9
SUM OF ALL RESPONSES TO PHQ QUESTIONS 1-9: 0
SUM OF ALL RESPONSES TO PHQ QUESTIONS 1-9: 0
2. FEELING DOWN, DEPRESSED OR HOPELESS: NOT AT ALL
SUM OF ALL RESPONSES TO PHQ QUESTIONS 1-9: 0
1. LITTLE INTEREST OR PLEASURE IN DOING THINGS: NOT AT ALL
SUM OF ALL RESPONSES TO PHQ QUESTIONS 1-9: 0

## 2025-06-17 NOTE — TELEPHONE ENCOUNTER
----- Message from Kacie MAURO sent at 6/16/2025  9:02 AM EDT -----  Regarding: ECC Escalation To Practice  ECC Escalation To Practice      Type of Escalation: Acute Care Symptom  --------------------------------------------------------------------------------------------------------------------------    Information for Provider: Sumanth Garcia MD    Patient is looking for appointment for: Symptom Cough/Cold   Reasons for Message: Unable to reach practice     Additional Information : Patient have symptoms cold, congestion, sore throat and coughing  for 2 weeks. She wanted to have an appointment ASAP within today.     --------------------------------------------------------------------------------------------------------------------------    Relationship to Patient: Self  Call Back Info: OK to leave message on [employer]  Preferred Call Back Number: Phone [phone]

## 2025-06-17 NOTE — PROGRESS NOTES
Chief Complaint   Patient presents with    Cold Symptoms     X 2 weeks          \"Have you been to the ER, urgent care clinic since your last visit?  Hospitalized since your last visit?\"    NO    “Have you seen or consulted any other health care providers outside of [facility] since your last visit?”    NO            Click Here for Release of Records Request           6/17/2025     8:57 AM   PHQ-9    Little interest or pleasure in doing things 0   Feeling down, depressed, or hopeless 0   PHQ-2 Score 0   PHQ-9 Total Score 0           Financial Resource Strain: Low Risk  (4/16/2024)    Overall Financial Resource Strain (CARDIA)     Difficulty of Paying Living Expenses: Not hard at all      Food Insecurity: No Food Insecurity (6/17/2025)    Hunger Vital Sign     Worried About Running Out of Food in the Last Year: Never true     Ran Out of Food in the Last Year: Never true          Health Maintenance Due   Topic Date Due    Pneumococcal 50+ years Vaccine (1 of 1 - PCV) Never done    Respiratory Syncytial Virus (RSV) Pregnant or age 60 yrs+ (1 - Risk 60-74 years 1-dose series) Never done    COVID-19 Vaccine (3 - 2024-25 season) 09/01/2024    DTaP/Tdap/Td vaccine (2 - Td or Tdap) 03/18/2025    Depression Screen  04/16/2025

## 2025-06-27 ENCOUNTER — TELEPHONE (OUTPATIENT)
Age: 65
End: 2025-06-27

## 2025-06-27 ENCOUNTER — HOSPITAL ENCOUNTER (OUTPATIENT)
Facility: HOSPITAL | Age: 65
Discharge: HOME | End: 2025-06-30
Attending: FAMILY MEDICINE
Payer: COMMERCIAL

## 2025-06-27 DIAGNOSIS — J40 BRONCHITIS: ICD-10-CM

## 2025-06-27 PROCEDURE — 71046 X-RAY EXAM CHEST 2 VIEWS: CPT

## 2025-06-27 NOTE — TELEPHONE ENCOUNTER
Pt called asking for a refill on medication z-pack pt stated that she is constantly coughing and is experiencing 50 /50 congestion and no congestion. Pt is also asking for updates on medication.    BCBN [phone]

## 2025-06-29 ENCOUNTER — RESULTS FOLLOW-UP (OUTPATIENT)
Age: 65
End: 2025-06-29

## 2025-07-02 ENCOUNTER — TELEPHONE (OUTPATIENT)
Age: 65
End: 2025-07-02

## 2025-07-02 RX ORDER — METHYLPREDNISOLONE 4 MG/1
TABLET ORAL
Qty: 1 KIT | Refills: 0 | Status: SHIPPED | OUTPATIENT
Start: 2025-07-02 | End: 2025-07-08

## 2025-07-02 NOTE — TELEPHONE ENCOUNTER
PT called stating she thought her symptoms were getting better, however, they are now getting worse and the PT is requesting a steroid to be prescribed